# Patient Record
Sex: FEMALE | Race: WHITE | NOT HISPANIC OR LATINO | URBAN - METROPOLITAN AREA
[De-identification: names, ages, dates, MRNs, and addresses within clinical notes are randomized per-mention and may not be internally consistent; named-entity substitution may affect disease eponyms.]

---

## 2017-02-08 ENCOUNTER — APPOINTMENT (RX ONLY)
Dept: URBAN - METROPOLITAN AREA CLINIC 41 | Facility: CLINIC | Age: 52
Setting detail: DERMATOLOGY
End: 2017-02-08

## 2017-02-08 DIAGNOSIS — D18.0 HEMANGIOMA: ICD-10-CM

## 2017-02-08 PROBLEM — D18.01 HEMANGIOMA OF SKIN AND SUBCUTANEOUS TISSUE: Status: ACTIVE | Noted: 2017-02-08

## 2017-02-08 PROCEDURE — 99201: CPT

## 2017-02-08 PROCEDURE — ? COUNSELING

## 2017-02-08 PROCEDURE — ? DEFER

## 2017-02-08 ASSESSMENT — LOCATION SIMPLE DESCRIPTION DERM
LOCATION SIMPLE: RIGHT LIP
LOCATION SIMPLE: LEFT LOWER LIP
LOCATION SIMPLE: RIGHT CHEEK

## 2017-02-08 ASSESSMENT — LOCATION ZONE DERM
LOCATION ZONE: FACE
LOCATION ZONE: LIP

## 2017-02-08 ASSESSMENT — LOCATION DETAILED DESCRIPTION DERM
LOCATION DETAILED: RIGHT UPPER CUTANEOUS LIP
LOCATION DETAILED: RIGHT INFERIOR VERMILION LIP
LOCATION DETAILED: RIGHT INFERIOR CENTRAL MALAR CHEEK
LOCATION DETAILED: LEFT INFERIOR VERMILION BORDER
LOCATION DETAILED: RIGHT INFERIOR LATERAL MALAR CHEEK

## 2017-02-08 NOTE — PROCEDURE: DEFER
Procedure To Be Performed At Next Visit: Laser: Q-switched Nd:Yag 1064nm
Detail Level: Simple
Scheduling Instructions (Optional): Patient has approx 5 angiomas and quoted at $75 per treatment, patient given topical numbing

## 2017-02-23 ENCOUNTER — APPOINTMENT (RX ONLY)
Dept: URBAN - METROPOLITAN AREA CLINIC 41 | Facility: CLINIC | Age: 52
Setting detail: DERMATOLOGY
End: 2017-02-23

## 2017-02-23 DIAGNOSIS — D18.0 HEMANGIOMA: ICD-10-CM

## 2017-02-23 PROBLEM — E78.5 HYPERLIPIDEMIA, UNSPECIFIED: Status: ACTIVE | Noted: 2017-02-23

## 2017-02-23 PROBLEM — D18.01 HEMANGIOMA OF SKIN AND SUBCUTANEOUS TISSUE: Status: ACTIVE | Noted: 2017-02-23

## 2017-02-23 PROBLEM — F32.9 MAJOR DEPRESSIVE DISORDER, SINGLE EPISODE, UNSPECIFIED: Status: ACTIVE | Noted: 2017-02-23

## 2017-02-23 PROBLEM — M12.9 ARTHROPATHY, UNSPECIFIED: Status: ACTIVE | Noted: 2017-02-23

## 2017-02-23 PROCEDURE — ? EXCEL V LASER

## 2017-02-23 ASSESSMENT — LOCATION DETAILED DESCRIPTION DERM
LOCATION DETAILED: LEFT INFERIOR VERMILION BORDER
LOCATION DETAILED: RIGHT INFERIOR CENTRAL MALAR CHEEK
LOCATION DETAILED: RIGHT LATERAL BUCCAL CHEEK
LOCATION DETAILED: RIGHT UPPER CUTANEOUS LIP

## 2017-02-23 ASSESSMENT — LOCATION ZONE DERM
LOCATION ZONE: FACE
LOCATION ZONE: LIP

## 2017-02-23 ASSESSMENT — LOCATION SIMPLE DESCRIPTION DERM
LOCATION SIMPLE: RIGHT LIP
LOCATION SIMPLE: RIGHT CHEEK
LOCATION SIMPLE: LEFT LOWER LIP

## 2017-02-23 NOTE — PROCEDURE: EXCEL V LASER
Fluence In J: 70
Pre-Procedure Text: After consent was obtained and the procedure was explained, all persons present in the exam room put on their protective eyewear. Cold gel was applied to the treatment areas.
Total Pulses: 1
Procedural Text: The treatment areas where then treated as noted above.
Post-Procedure Text: Following the treatment, ice and sunblock was applied to the treatment areas.  Post-care instructions were discussed.
Topical Anesthesia Type: 30% lidocaine
Consent: Written consent obtained, risks reviewed including but not limited to crusting, scabbing, blistering, scarring, darker or lighter pigmentary change, and/or incomplete removal.
Detail Level: Detailed
Length Of Topical Anesthesia Application (Optional): 40 minutes
Laser Type: KTP 1064nm
Post-Care Instructions: I reviewed with the patient in detail post-care instructions. Patient is to apply vaseline with a q-tip to all crusted areas, and avoid picking at any scabs. Pt should stay away from the sun and wear sun protection until fully healed.
Pulse Width In Msec: 45
Spot Size: 5
Price (Use Numbers Only, No Special Characters Or $): 75
Fluence In J: 80
Pulse Width In Msec: 30
Pulse Width In Msec: 10
Fluence In J: 7.0
Laser Type: KTP 532nm

## 2017-06-01 ENCOUNTER — APPOINTMENT (RX ONLY)
Dept: URBAN - METROPOLITAN AREA CLINIC 41 | Facility: CLINIC | Age: 52
Setting detail: DERMATOLOGY
End: 2017-06-01

## 2017-06-01 DIAGNOSIS — L98.8 OTHER SPECIFIED DISORDERS OF THE SKIN AND SUBCUTANEOUS TISSUE: ICD-10-CM

## 2017-06-01 DIAGNOSIS — I78.8 OTHER DISEASES OF CAPILLARIES: ICD-10-CM

## 2017-06-01 DIAGNOSIS — D18.0 HEMANGIOMA: ICD-10-CM | Status: IMPROVED

## 2017-06-01 DIAGNOSIS — Z41.9 ENCOUNTER FOR PROCEDURE FOR PURPOSES OTHER THAN REMEDYING HEALTH STATE, UNSPECIFIED: ICD-10-CM

## 2017-06-01 PROBLEM — D18.01 HEMANGIOMA OF SKIN AND SUBCUTANEOUS TISSUE: Status: ACTIVE | Noted: 2017-06-01

## 2017-06-01 PROCEDURE — ? EXCEL V LASER

## 2017-06-01 PROCEDURE — ? MEDICAL CONSULTATION: FILLERS

## 2017-06-01 ASSESSMENT — LOCATION ZONE DERM
LOCATION ZONE: LIP
LOCATION ZONE: FACE

## 2017-06-01 ASSESSMENT — LOCATION DETAILED DESCRIPTION DERM
LOCATION DETAILED: RIGHT INFERIOR CENTRAL MALAR CHEEK
LOCATION DETAILED: RIGHT LATERAL BUCCAL CHEEK
LOCATION DETAILED: RIGHT INFERIOR VERMILION LIP
LOCATION DETAILED: RIGHT UPPER CUTANEOUS LIP

## 2017-06-01 ASSESSMENT — LOCATION SIMPLE DESCRIPTION DERM
LOCATION SIMPLE: RIGHT LIP
LOCATION SIMPLE: RIGHT CHEEK

## 2017-06-01 NOTE — PROCEDURE: EXCEL V LASER
Consent: Written consent obtained, risks reviewed including but not limited to crusting, scabbing, blistering, scarring, darker or lighter pigmentary change, and/or incomplete removal.
Pre-Procedure Text: After consent was obtained and the procedure was explained, all persons present in the exam room put on their protective eyewear. Cold gel was applied to the treatment areas.
Detail Level: Simple
Post-Care Instructions: I reviewed with the patient in detail post-care instructions. Patient is to apply vaseline with a q-tip to all crusted areas, and avoid picking at any scabs. Pt should stay away from the sun and wear sun protection until fully healed.
Spot Size: 5
Post-Procedure Text: Following the treatment, ice and sunblock was applied to the treatment areas.  Post-care instructions were discussed.
Laser Type: KTP 1064nm
Procedural Text: The treatment areas where then treated as noted above. These were veins on the nasal ala.
Pulse Width In Msec: 35
Treatment Number (Will Not Render If 0): 0
Fluence In J: 85.0
Total Pulses: 7
Total Pulses: 2
Pulse Width In Msec: 10
Laser Type: KTP 532nm
Fluence In J: 8.0

## 2017-09-21 ENCOUNTER — APPOINTMENT (RX ONLY)
Dept: URBAN - METROPOLITAN AREA CLINIC 41 | Facility: CLINIC | Age: 52
Setting detail: DERMATOLOGY
End: 2017-09-21

## 2017-09-21 DIAGNOSIS — L98.8 OTHER SPECIFIED DISORDERS OF THE SKIN AND SUBCUTANEOUS TISSUE: ICD-10-CM | Status: IMPROVED

## 2017-09-21 DIAGNOSIS — D18.0 HEMANGIOMA: ICD-10-CM

## 2017-09-21 DIAGNOSIS — I78.8 OTHER DISEASES OF CAPILLARIES: ICD-10-CM | Status: IMPROVED

## 2017-09-21 PROBLEM — D18.01 HEMANGIOMA OF SKIN AND SUBCUTANEOUS TISSUE: Status: ACTIVE | Noted: 2017-09-21

## 2017-09-21 PROCEDURE — ? EXCEL V LASER

## 2017-09-21 ASSESSMENT — LOCATION DETAILED DESCRIPTION DERM
LOCATION DETAILED: RIGHT INFERIOR VERMILION LIP
LOCATION DETAILED: RIGHT UPPER CUTANEOUS LIP
LOCATION DETAILED: RIGHT INFERIOR CENTRAL MALAR CHEEK

## 2017-09-21 ASSESSMENT — LOCATION ZONE DERM
LOCATION ZONE: FACE
LOCATION ZONE: LIP

## 2017-09-21 ASSESSMENT — LOCATION SIMPLE DESCRIPTION DERM
LOCATION SIMPLE: RIGHT CHEEK
LOCATION SIMPLE: RIGHT LIP

## 2017-09-21 NOTE — PROCEDURE: EXCEL V LASER
Post-Procedure Text: Following the treatment, ice and sunblock was applied to the treatment areas.  Post-care instructions were discussed.
Laser Type: KTP 1064nm
Pre-Procedure Text: After consent was obtained and the procedure was explained, all persons present in the exam room put on their protective eyewear. Cold gel was applied to the treatment areas.
Treatment Number (Will Not Render If 0): 2
Topical Anesthesia Type: 30% lidocaine
Detail Level: Simple
Consent: Written consent obtained, risks reviewed including but not limited to crusting, scabbing, blistering, scarring, darker or lighter pigmentary change, and/or incomplete removal.
Length Of Topical Anesthesia Application (Optional): 30 minutes
Price (Use Numbers Only, No Special Characters Or $): 75
Procedural Text: The treatment areas where then treated as noted above. These were veins on the nasal ala.
Spot Size: 5
Fluence In J: 85
Post-Care Instructions: I reviewed with the patient in detail post-care instructions. Patient is to apply vaseline with a q-tip to all crusted areas, and avoid picking at any scabs. Pt should stay away from the sun and wear sun protection until fully healed.
Fluence In J: 8.0
Laser Type: KTP 532nm
Pulse Width In Msec: 10
Pulse Width In Msec: 12

## 2020-11-30 ENCOUNTER — APPOINTMENT (RX ONLY)
Dept: URBAN - METROPOLITAN AREA CLINIC 41 | Facility: CLINIC | Age: 55
Setting detail: DERMATOLOGY
End: 2020-11-30

## 2020-11-30 VITALS — TEMPERATURE: 96.4 F

## 2020-11-30 DIAGNOSIS — Z41.9 ENCOUNTER FOR PROCEDURE FOR PURPOSES OTHER THAN REMEDYING HEALTH STATE, UNSPECIFIED: ICD-10-CM

## 2020-11-30 PROCEDURE — ? COSMETIC CONSULTATION - RED SPOTS

## 2020-11-30 PROCEDURE — ? MEDICAL CONSULTATION: FILLERS

## 2020-11-30 PROCEDURE — ? MEDICAL CONSULTATION: DYNAMIC RHYTIDES

## 2020-12-21 ENCOUNTER — APPOINTMENT (RX ONLY)
Dept: URBAN - METROPOLITAN AREA CLINIC 41 | Facility: CLINIC | Age: 55
Setting detail: DERMATOLOGY
End: 2020-12-21

## 2020-12-21 VITALS — TEMPERATURE: 97.7 F

## 2020-12-21 DIAGNOSIS — Z41.9 ENCOUNTER FOR PROCEDURE FOR PURPOSES OTHER THAN REMEDYING HEALTH STATE, UNSPECIFIED: ICD-10-CM

## 2020-12-21 PROCEDURE — ? DYSPORT

## 2020-12-21 PROCEDURE — ? EXCEL V LASER

## 2020-12-21 ASSESSMENT — LOCATION SIMPLE DESCRIPTION DERM: LOCATION SIMPLE: RIGHT CHEEK

## 2020-12-21 ASSESSMENT — LOCATION ZONE DERM: LOCATION ZONE: FACE

## 2020-12-21 ASSESSMENT — LOCATION DETAILED DESCRIPTION DERM: LOCATION DETAILED: RIGHT INFERIOR CENTRAL MALAR CHEEK

## 2020-12-21 NOTE — PROCEDURE: DYSPORT
Right Pupillary Line Units: 0
Lot #: [id number]
Show Lcl Units: No
Show Glabellar Units: Yes
Detail Level: Simple
Additional Area 1 Units: 60
Expiration Date (Month Year): 12/31/2020
Dilution (U/0.1 Cc): 10
Consent: Written consent obtained. Risks include but not limited to lid/brow ptosis, bruising, swelling, diplopia, temporary effect, incomplete chemical denervation.
Post-Care Instructions: Patient instructed to not lie down for 4 hours and limit physical activity for 24 hours. Patient instructed not to travel by airplane for 48 hours.
Additional Area 1 Location: see drawing

## 2020-12-21 NOTE — PROCEDURE: EXCEL V LASER
Consent: Written consent obtained, risks reviewed including but not limited to crusting, scabbing, blistering, scarring, darker or lighter pigmentary change, and/or incomplete removal.
Laser Type: KTP 532nm
External Cooling Fan Speed: 5
Post-Care Instructions: I reviewed with the patient in detail post-care instructions. Patient is to apply vaseline, aquaphor, or any unscented moisturizer to all affected areas, and avoid picking at any scabs. Pt should stay away from the sun and wear sun protection until fully healed.
Total Pulses: 17
Price (Use Numbers Only, No Special Characters Or $): 150
Treatment Number (Will Not Render If 0): 2
Topical Anesthesia Type: 30% lidocaine
Temperature: 5 C
Fluence In J: 8.6
Pulse Width In Msec: 10
Detail Level: Zone

## 2021-01-13 ENCOUNTER — APPOINTMENT (RX ONLY)
Dept: URBAN - METROPOLITAN AREA CLINIC 2 | Facility: CLINIC | Age: 56
Setting detail: DERMATOLOGY
End: 2021-01-13

## 2021-01-13 DIAGNOSIS — Z41.9 ENCOUNTER FOR PROCEDURE FOR PURPOSES OTHER THAN REMEDYING HEALTH STATE, UNSPECIFIED: ICD-10-CM

## 2021-01-13 PROCEDURE — ? FILLERS

## 2021-01-13 NOTE — PROCEDURE: FILLERS
Cheeks Filler Volume In Cc: 1
Additional Area 5 Volume In Cc: 0
Filler: Restylane Defyne
Map Statment: See Attach Map for Complete Details
Lot #: [id number]
Include Cannula Information In Note?: No
Anesthesia Type: 0.2% lidocaine (mixed within filler)
Lot #: [id number]
Expiration Date (Month Year): 2022-11-30
Lot #: [id number]
Expiration Date (Month Year): 2019/08/05
Expiration Date (Month Year): 2022-01-31
Additional Area 1 Location: see drawing
Topical Anesthesia?: 30% lidocaine
Detail Level: Simple
Consent: Written consent obtained. Risks include but not limited to bruising, beading, irregular texture, ulceration, infection, allergic reaction, scar formation, incomplete augmentation, temporary nature, procedural pain.
Filler: Faustino Friend
Post-Care Instructions: Patient instructed to apply ice to reduce swelling.
Use Map Statement For Sites (Optional): Yes

## 2023-10-30 ENCOUNTER — ANESTHESIA EVENT (OUTPATIENT)
Dept: OPERATING ROOM | Age: 58
End: 2023-10-30
Payer: COMMERCIAL

## 2023-10-30 RX ORDER — LEVOTHYROXINE SODIUM 0.03 MG/1
75 TABLET ORAL DAILY
COMMUNITY

## 2023-11-03 ASSESSMENT — LIFESTYLE VARIABLES: SMOKING_STATUS: 0

## 2023-11-06 ENCOUNTER — ANESTHESIA (OUTPATIENT)
Dept: OPERATING ROOM | Age: 58
End: 2023-11-06
Payer: COMMERCIAL

## 2023-11-06 ENCOUNTER — HOSPITAL ENCOUNTER (OUTPATIENT)
Age: 58
Setting detail: OUTPATIENT SURGERY
Discharge: HOME | End: 2023-11-06
Attending: PLASTIC SURGERY | Admitting: PLASTIC SURGERY
Payer: COMMERCIAL

## 2023-11-06 VITALS
RESPIRATION RATE: 16 BRPM | WEIGHT: 157.6 LBS | BODY MASS INDEX: 24.74 KG/M2 | SYSTOLIC BLOOD PRESSURE: 137 MMHG | HEART RATE: 61 BPM | TEMPERATURE: 97 F | HEIGHT: 67 IN | DIASTOLIC BLOOD PRESSURE: 77 MMHG | OXYGEN SATURATION: 100 %

## 2023-11-06 DIAGNOSIS — C44.319 BASAL CELL CARCINOMA OF SKIN OF OTHER PARTS OF FACE: ICD-10-CM

## 2023-11-06 PROCEDURE — 6360000002 HC RX W HCPCS

## 2023-11-06 PROCEDURE — 3600000014 HC SURGERY LEVEL 4 ADDTL 15MIN: Performed by: PLASTIC SURGERY

## 2023-11-06 PROCEDURE — 7100000010 HC PHASE II RECOVERY - FIRST 15 MIN: Performed by: PLASTIC SURGERY

## 2023-11-06 PROCEDURE — 3700000001 HC ADD 15 MINUTES (ANESTHESIA): Performed by: PLASTIC SURGERY

## 2023-11-06 PROCEDURE — 3700000000 HC ANESTHESIA ATTENDED CARE: Performed by: PLASTIC SURGERY

## 2023-11-06 PROCEDURE — 2580000003 HC RX 258

## 2023-11-06 PROCEDURE — 2580000003 HC RX 258: Performed by: STUDENT IN AN ORGANIZED HEALTH CARE EDUCATION/TRAINING PROGRAM

## 2023-11-06 PROCEDURE — 6370000000 HC RX 637 (ALT 250 FOR IP): Performed by: PLASTIC SURGERY

## 2023-11-06 PROCEDURE — 2500000003 HC RX 250 WO HCPCS: Performed by: PLASTIC SURGERY

## 2023-11-06 PROCEDURE — 7100000011 HC PHASE II RECOVERY - ADDTL 15 MIN: Performed by: PLASTIC SURGERY

## 2023-11-06 PROCEDURE — 3600000004 HC SURGERY LEVEL 4 BASE: Performed by: PLASTIC SURGERY

## 2023-11-06 PROCEDURE — 88305 TISSUE EXAM BY PATHOLOGIST: CPT

## 2023-11-06 PROCEDURE — 2709999900 HC NON-CHARGEABLE SUPPLY: Performed by: PLASTIC SURGERY

## 2023-11-06 RX ORDER — GINSENG 100 MG
CAPSULE ORAL PRN
Status: DISCONTINUED | OUTPATIENT
Start: 2023-11-06 | End: 2023-11-06 | Stop reason: ALTCHOICE

## 2023-11-06 RX ORDER — SODIUM CHLORIDE, SODIUM LACTATE, POTASSIUM CHLORIDE, CALCIUM CHLORIDE 600; 310; 30; 20 MG/100ML; MG/100ML; MG/100ML; MG/100ML
INJECTION, SOLUTION INTRAVENOUS CONTINUOUS PRN
Status: DISCONTINUED | OUTPATIENT
Start: 2023-11-06 | End: 2023-11-06 | Stop reason: SDUPTHER

## 2023-11-06 RX ORDER — LIDOCAINE HYDROCHLORIDE AND EPINEPHRINE 10; 10 MG/ML; UG/ML
INJECTION, SOLUTION INFILTRATION; PERINEURAL PRN
Status: DISCONTINUED | OUTPATIENT
Start: 2023-11-06 | End: 2023-11-06 | Stop reason: ALTCHOICE

## 2023-11-06 RX ORDER — MIDAZOLAM HYDROCHLORIDE 1 MG/ML
INJECTION INTRAMUSCULAR; INTRAVENOUS PRN
Status: DISCONTINUED | OUTPATIENT
Start: 2023-11-06 | End: 2023-11-06 | Stop reason: SDUPTHER

## 2023-11-06 RX ORDER — SODIUM CHLORIDE 9 MG/ML
INJECTION, SOLUTION INTRAVENOUS PRN
Status: DISCONTINUED | OUTPATIENT
Start: 2023-11-06 | End: 2023-11-06 | Stop reason: HOSPADM

## 2023-11-06 RX ORDER — SODIUM CHLORIDE, SODIUM LACTATE, POTASSIUM CHLORIDE, CALCIUM CHLORIDE 600; 310; 30; 20 MG/100ML; MG/100ML; MG/100ML; MG/100ML
INJECTION, SOLUTION INTRAVENOUS CONTINUOUS
Status: DISCONTINUED | OUTPATIENT
Start: 2023-11-06 | End: 2023-11-06 | Stop reason: HOSPADM

## 2023-11-06 RX ORDER — PROPOFOL 10 MG/ML
INJECTION, EMULSION INTRAVENOUS PRN
Status: DISCONTINUED | OUTPATIENT
Start: 2023-11-06 | End: 2023-11-06 | Stop reason: SDUPTHER

## 2023-11-06 RX ORDER — FENTANYL CITRATE 50 UG/ML
INJECTION, SOLUTION INTRAMUSCULAR; INTRAVENOUS PRN
Status: DISCONTINUED | OUTPATIENT
Start: 2023-11-06 | End: 2023-11-06 | Stop reason: SDUPTHER

## 2023-11-06 RX ORDER — SODIUM CHLORIDE 9 MG/ML
INJECTION, SOLUTION INTRAVENOUS CONTINUOUS
Status: DISCONTINUED | OUTPATIENT
Start: 2023-11-06 | End: 2023-11-06 | Stop reason: HOSPADM

## 2023-11-06 RX ORDER — SODIUM CHLORIDE 0.9 % (FLUSH) 0.9 %
5-40 SYRINGE (ML) INJECTION PRN
Status: DISCONTINUED | OUTPATIENT
Start: 2023-11-06 | End: 2023-11-06 | Stop reason: HOSPADM

## 2023-11-06 RX ORDER — SODIUM CHLORIDE 0.9 % (FLUSH) 0.9 %
5-40 SYRINGE (ML) INJECTION EVERY 12 HOURS SCHEDULED
Status: DISCONTINUED | OUTPATIENT
Start: 2023-11-06 | End: 2023-11-06 | Stop reason: HOSPADM

## 2023-11-06 RX ADMIN — FENTANYL CITRATE 25 MCG: 50 INJECTION INTRAMUSCULAR; INTRAVENOUS at 08:27

## 2023-11-06 RX ADMIN — SODIUM CHLORIDE, POTASSIUM CHLORIDE, SODIUM LACTATE AND CALCIUM CHLORIDE: 600; 310; 30; 20 INJECTION, SOLUTION INTRAVENOUS at 07:52

## 2023-11-06 RX ADMIN — SODIUM CHLORIDE, POTASSIUM CHLORIDE, SODIUM LACTATE AND CALCIUM CHLORIDE: 600; 310; 30; 20 INJECTION, SOLUTION INTRAVENOUS at 07:31

## 2023-11-06 RX ADMIN — PROPOFOL 30 MG: 10 INJECTION, EMULSION INTRAVENOUS at 07:57

## 2023-11-06 RX ADMIN — FENTANYL CITRATE 25 MCG: 50 INJECTION INTRAMUSCULAR; INTRAVENOUS at 08:09

## 2023-11-06 RX ADMIN — MIDAZOLAM 2 MG: 1 INJECTION INTRAMUSCULAR; INTRAVENOUS at 07:54

## 2023-11-06 RX ADMIN — FENTANYL CITRATE 50 MCG: 50 INJECTION INTRAMUSCULAR; INTRAVENOUS at 07:57

## 2023-11-06 RX ADMIN — PROPOFOL 50 MCG/KG/MIN: 10 INJECTION, EMULSION INTRAVENOUS at 07:58

## 2023-11-06 ASSESSMENT — PAIN SCALES - GENERAL
PAINLEVEL_OUTOF10: 0

## 2023-11-06 ASSESSMENT — PAIN - FUNCTIONAL ASSESSMENT: PAIN_FUNCTIONAL_ASSESSMENT: NONE - DENIES PAIN

## 2023-11-06 NOTE — DISCHARGE INSTRUCTIONS
No strenuous activity. Tylenol and ibuprofen for pain. Keep dressings intact. Follow-up with me tomorrow. Infection After Surgery: Care Instructions  Overview  After surgery, an infection is always possible. It doesn't mean that the surgery didn't go well. Because an infection can be serious, your doctor has taken steps to manage it. Your doctor checked the infection and cleaned it if necessary. Your doctor may have made an opening in the area so that the pus can drain out. You may have gauze in the cut so that the area will stay open and keep draining. You may need antibiotics. You will need to follow up with your doctor to make sure the infection has gone away. Follow-up care is a key part of your treatment and safety. Be sure to make and go to all appointments, and call your doctor if you are having problems. It's also a good idea to know your test results and keep a list of the medicines you take. How can you care for yourself at home? Make sure your surgeon knows about the infection, especially if you saw another doctor about your symptoms. If your doctor prescribed antibiotics, take them as directed. Do not stop taking them just because you feel better. You need to take the full course of antibiotics. Ask your doctor if you can take an over-the-counter pain medicine, such as acetaminophen (Tylenol), ibuprofen (Advil, Motrin), or naproxen (Aleve). Be safe with medicines. Read and follow all instructions on the label. Do not take two or more pain medicines at the same time unless the doctor told you to. Many pain medicines have acetaminophen, which is Tylenol. Too much acetaminophen (Tylenol) can be harmful. Prop up the area on a pillow anytime you sit or lie down during the next 3 days. Try to keep it above the level of your heart. This will help reduce swelling. Keep the skin clean and dry. You may have a bandage over the cut (incision).  A bandage helps the incision heal and

## 2023-11-06 NOTE — ANESTHESIA POSTPROCEDURE EVALUATION
Department of Anesthesiology  Postprocedure Note    Patient: Puneet Mckeon  MRN: [id number]  YOB: 1965  Date of evaluation: 11/6/2023      Procedure Summary     Date: 11/06/23 Room / Location: [address] [place] [address] 01 / [id number] Hugo Cavazos    Anesthesia Start: 8611 Anesthesia Stop: 0848    Procedure: EXCISION BASAL CELL CARCINOMA RIGHT LATERAL NASAL SIDEWALL WITH FROZEN SECTION AND RECONSTRUCTION (Right: Nose) Diagnosis:       Basal cell carcinoma of skin of other parts of face      (Basal cell carcinoma of skin of other parts of face [C44.319])    Surgeons: Danni Reynoso MD Responsible Provider: Denzel Rojo MD    Anesthesia Type: MAC ASA Status: 2          Anesthesia Type: No value filed.     Merry Phase I: Merry Score: 10    Merry Phase II:        Anesthesia Post Evaluation    Patient location during evaluation: bedside  Patient participation: complete - patient participated  Level of consciousness: awake  Airway patency: patent  Nausea & Vomiting: no nausea and no vomiting  Complications: no  Cardiovascular status: hemodynamically stable and blood pressure returned to baseline  Respiratory status: acceptable  Hydration status: euvolemic  Pain management: satisfactory to patient

## 2023-11-06 NOTE — PROGRESS NOTES
INTRAOPERATIVE CONSULTATION (with FROZEN SECTION)     Skin, Right lateral nasal sidewall, excision: The sampled 12:00, 6:00, 3:00, 9:00 and deep margins are NEGATIVE for carcinoma.     Gerardo Cota MD

## 2023-11-06 NOTE — OP NOTE
Operative Note      Patient: Ted Tellez  YOB: 1965  MRN: [id number]    Date of Procedure: 11/6/2023    Preoperative diagnosis: Basal cell carcinoma right lateral nasal sidewall    Post-Op Diagnosis: Same       Procedure #1: Basal cell carcinoma right lateral nasal sidewall with frozen section control measuring 1.1 x 0.8 cm  Procedure #2: Complex closure right lateral nasal sidewall measuring 1.8 cm    Surgeon(s):  Edil Irizarry MD    Assistant:   * No surgical staff found *    Anesthesia: Monitor Anesthesia Care    Estimated Blood Loss (mL): Minimal    Complications: None    Specimens:   ID Type Source Tests Collected by Time Destination   A : BASAL CELL CARCINOMA RIGHT LATERAL NASAL SIDEWALL WITH FROZEN SECTION STITCH AT 12 O'CLOCK Tissue Tissue SURGICAL PATHOLOGY Yuliana Brooks MD 11/6/2023 0809        Implants:  * No implants in log *      Drains: * No LDAs found *    Findings: Basal cell carcinoma with clear margins seen on frozen section control    This procedure was not performed to treat primary cutaneous melanoma through wide local excision      Detailed Description of Procedure: This is a 60-year-old female with a biopsy-proven basal cell carcinoma at the junction of the right lateral nasal sidewall with the right lower eyelid. This lesion is just inferior and medial to the right medial canthus. The patient was seen in the preoperative holding area and marked. This was confirmed with the patient. She was then brought back to the operating room and placed in the supine position where MAC anesthesia was initiated. The face was then prepped and draped in sterile fashion. Under loupe magnification I marked out the pattern for excision such that there was a border of normal-appearing skin measuring 2 to 3 mm in all directions. This measured approximately 1.1 by 0.8 cm.  1% lidocaine with epinephrine was then injected locally. Incisions were made along our markings.
Full range of motion of upper and lower extremities, no joint tenderness/swelling.

## 2023-11-09 LAB — SURGICAL PATHOLOGY REPORT: NORMAL

## 2024-10-21 ENCOUNTER — APPOINTMENT (OUTPATIENT)
Dept: CARDIOLOGY | Facility: HOSPITAL | Age: 59
End: 2024-10-21
Payer: COMMERCIAL

## 2024-10-21 ENCOUNTER — APPOINTMENT (OUTPATIENT)
Dept: RADIOLOGY | Facility: HOSPITAL | Age: 59
End: 2024-10-21
Payer: COMMERCIAL

## 2024-10-21 ENCOUNTER — HOSPITAL ENCOUNTER (EMERGENCY)
Facility: HOSPITAL | Age: 59
Discharge: HOME | End: 2024-10-21
Attending: EMERGENCY MEDICINE
Payer: COMMERCIAL

## 2024-10-21 VITALS
DIASTOLIC BLOOD PRESSURE: 84 MMHG | TEMPERATURE: 97.7 F | HEIGHT: 67 IN | RESPIRATION RATE: 18 BRPM | SYSTOLIC BLOOD PRESSURE: 149 MMHG | WEIGHT: 162 LBS | OXYGEN SATURATION: 95 % | BODY MASS INDEX: 25.43 KG/M2 | HEART RATE: 58 BPM

## 2024-10-21 DIAGNOSIS — R07.9 CHEST PAIN AT REST: Primary | ICD-10-CM

## 2024-10-21 LAB
ALBUMIN SERPL BCP-MCNC: 4.1 G/DL (ref 3.4–5)
ALP SERPL-CCNC: 95 U/L (ref 33–110)
ALT SERPL W P-5'-P-CCNC: 19 U/L (ref 7–45)
ANION GAP SERPL CALC-SCNC: 10 MMOL/L (ref 10–20)
AST SERPL W P-5'-P-CCNC: 15 U/L (ref 9–39)
BASOPHILS # BLD AUTO: 0.07 X10*3/UL (ref 0–0.1)
BASOPHILS NFR BLD AUTO: 1.3 %
BILIRUB SERPL-MCNC: 0.3 MG/DL (ref 0–1.2)
BUN SERPL-MCNC: 18 MG/DL (ref 6–23)
CALCIUM SERPL-MCNC: 9.1 MG/DL (ref 8.6–10.3)
CARDIAC TROPONIN I PNL SERPL HS: 3 NG/L (ref 0–13)
CARDIAC TROPONIN I PNL SERPL HS: 3 NG/L (ref 0–13)
CHLORIDE SERPL-SCNC: 104 MMOL/L (ref 98–107)
CO2 SERPL-SCNC: 30 MMOL/L (ref 21–32)
CREAT SERPL-MCNC: 0.88 MG/DL (ref 0.5–1.05)
EGFRCR SERPLBLD CKD-EPI 2021: 76 ML/MIN/1.73M*2
EOSINOPHIL # BLD AUTO: 0.09 X10*3/UL (ref 0–0.7)
EOSINOPHIL NFR BLD AUTO: 1.7 %
ERYTHROCYTE [DISTWIDTH] IN BLOOD BY AUTOMATED COUNT: 12.3 % (ref 11.5–14.5)
GLUCOSE SERPL-MCNC: 119 MG/DL (ref 74–99)
HCT VFR BLD AUTO: 40.4 % (ref 36–46)
HGB BLD-MCNC: 13.3 G/DL (ref 12–16)
HOLD SPECIMEN: NORMAL
IMM GRANULOCYTES # BLD AUTO: 0.1 X10*3/UL (ref 0–0.7)
IMM GRANULOCYTES NFR BLD AUTO: 1.9 % (ref 0–0.9)
LYMPHOCYTES # BLD AUTO: 1.16 X10*3/UL (ref 1.2–4.8)
LYMPHOCYTES NFR BLD AUTO: 22.1 %
MCH RBC QN AUTO: 30.2 PG (ref 26–34)
MCHC RBC AUTO-ENTMCNC: 32.9 G/DL (ref 32–36)
MCV RBC AUTO: 92 FL (ref 80–100)
MONOCYTES # BLD AUTO: 0.56 X10*3/UL (ref 0.1–1)
MONOCYTES NFR BLD AUTO: 10.6 %
NEUTROPHILS # BLD AUTO: 3.28 X10*3/UL (ref 1.2–7.7)
NEUTROPHILS NFR BLD AUTO: 62.4 %
NRBC BLD-RTO: 0 /100 WBCS (ref 0–0)
PLATELET # BLD AUTO: 198 X10*3/UL (ref 150–450)
POTASSIUM SERPL-SCNC: 3.5 MMOL/L (ref 3.5–5.3)
PROT SERPL-MCNC: 7.2 G/DL (ref 6.4–8.2)
RBC # BLD AUTO: 4.4 X10*6/UL (ref 4–5.2)
SODIUM SERPL-SCNC: 140 MMOL/L (ref 136–145)
WBC # BLD AUTO: 5.3 X10*3/UL (ref 4.4–11.3)

## 2024-10-21 PROCEDURE — 84075 ASSAY ALKALINE PHOSPHATASE: CPT | Performed by: EMERGENCY MEDICINE

## 2024-10-21 PROCEDURE — 71045 X-RAY EXAM CHEST 1 VIEW: CPT | Mod: FOREIGN READ | Performed by: RADIOLOGY

## 2024-10-21 PROCEDURE — 71275 CT ANGIOGRAPHY CHEST: CPT | Mod: FOREIGN READ | Performed by: RADIOLOGY

## 2024-10-21 PROCEDURE — 36415 COLL VENOUS BLD VENIPUNCTURE: CPT | Performed by: EMERGENCY MEDICINE

## 2024-10-21 PROCEDURE — 93005 ELECTROCARDIOGRAM TRACING: CPT

## 2024-10-21 PROCEDURE — 84484 ASSAY OF TROPONIN QUANT: CPT | Performed by: EMERGENCY MEDICINE

## 2024-10-21 PROCEDURE — 71045 X-RAY EXAM CHEST 1 VIEW: CPT

## 2024-10-21 PROCEDURE — 99285 EMERGENCY DEPT VISIT HI MDM: CPT | Mod: 25

## 2024-10-21 PROCEDURE — 71275 CT ANGIOGRAPHY CHEST: CPT

## 2024-10-21 PROCEDURE — 85025 COMPLETE CBC W/AUTO DIFF WBC: CPT | Performed by: EMERGENCY MEDICINE

## 2024-10-21 PROCEDURE — 2550000001 HC RX 255 CONTRASTS: Performed by: EMERGENCY MEDICINE

## 2024-10-21 ASSESSMENT — COLUMBIA-SUICIDE SEVERITY RATING SCALE - C-SSRS
2. HAVE YOU ACTUALLY HAD ANY THOUGHTS OF KILLING YOURSELF?: NO
6. HAVE YOU EVER DONE ANYTHING, STARTED TO DO ANYTHING, OR PREPARED TO DO ANYTHING TO END YOUR LIFE?: NO
1. IN THE PAST MONTH, HAVE YOU WISHED YOU WERE DEAD OR WISHED YOU COULD GO TO SLEEP AND NOT WAKE UP?: NO

## 2024-10-21 ASSESSMENT — HEART SCORE
ECG: NON-SPECIFIC REPOLARIZATION DISTURBANCE
AGE: 45-64
TROPONIN: LESS THAN OR EQUAL TO NORMAL LIMIT
HISTORY: MODERATELY SUSPICIOUS
RISK FACTORS: 1-2 RISK FACTORS
HEART SCORE: 4

## 2024-10-21 ASSESSMENT — PAIN SCALES - GENERAL: PAINLEVEL_OUTOF10: 0 - NO PAIN

## 2024-10-21 ASSESSMENT — PAIN - FUNCTIONAL ASSESSMENT: PAIN_FUNCTIONAL_ASSESSMENT: 0-10

## 2024-10-21 NOTE — ED PROVIDER NOTES
Department of Emergency Medicine   ED  Provider Note  Admit Date/RoomTime: 10/21/2024  6:49 PM  ED Room: AC02/AC02                  History of Present Illness:   Candi Mann is a 59 y.o. female presenting to the ED for chest pain, beginning 1 week ago.  Pain has been episodic.  The complaint has been intermittent, moderate in severity, and worsened by nothing.  Patient says multiple episodes of midsternal chest discomfort over the last week.  Nothing seems to make these episodes, ago.  They last about 4 to 5 minutes or so.  She describes them as pressure/tightness.  She sometimes has some pain in the back of her neck with it.  Occasionally she has had some right arm pain.  She says she has only had nausea with one episode last week.  She has had no diaphoresis.  No new leg pain swelling or calf tenderness.  No recent long distance travel.  No URI symptoms.  She has history of hypothyroidism and hyperlipidemia.  No history of diabetes or hypertension.  She has a strong family history of heart disease.  She is a non-smoker at this time she says she quit 18 years ago.  Rare alcohol use.  No marijuana use.  Patient reports past history of echocardiogram and a stress test about 10 years ago.  Patient states she went to urgent care and was referred to the ER.  Patient currently denies chest pain.    Review of Systems:   Pertinent positives and review of systems as noted above.  Remaining 10 review of systems is negative or noncontributory to today's episode of care.  Review of Systems   A complete review of systems is otherwise negative except as noted above    --------------------------------------------- PAST HISTORY ---------------------------------------------  Past Medical History:  has a past medical history of Disease of thyroid gland and Mitral valve disorder.    Past Surgical History:  has a past surgical history that includes Hysterectomy.    Social History:  reports that she has quit smoking. Her smoking  use included cigarettes. She has never used smokeless tobacco. She reports that she does not currently use drugs.No tobacco use currently.  Rare alcohol use.  No marijuana or recreational drug use.    Family History: family history is not on file. Unless otherwise noted, family history is non contributory.  Patient states that everyone in her family has heart disease or is  from heart disease.    Patient's Medications    No medications on file      The patient’s home medications have been reviewed.    Allergies: Lavender oil and Penicillins    -------------------------------------------------- RESULTS -------------------------------------------------  All laboratory and radiology results have been personally reviewed by myself   LABS:  Labs Reviewed   CBC WITH AUTO DIFFERENTIAL - Abnormal       Result Value    WBC 5.3      nRBC 0.0      RBC 4.40      Hemoglobin 13.3      Hematocrit 40.4      MCV 92      MCH 30.2      MCHC 32.9      RDW 12.3      Platelets 198      Neutrophils % 62.4      Immature Granulocytes %, Automated 1.9 (*)     Lymphocytes % 22.1      Monocytes % 10.6      Eosinophils % 1.7      Basophils % 1.3      Neutrophils Absolute 3.28      Immature Granulocytes Absolute, Automated 0.10      Lymphocytes Absolute 1.16 (*)     Monocytes Absolute 0.56      Eosinophils Absolute 0.09      Basophils Absolute 0.07     COMPREHENSIVE METABOLIC PANEL - Abnormal    Glucose 119 (*)     Sodium 140      Potassium 3.5      Chloride 104      Bicarbonate 30      Anion Gap 10      Urea Nitrogen 18      Creatinine 0.88      eGFR 76      Calcium 9.1      Albumin 4.1      Alkaline Phosphatase 95      Total Protein 7.2      AST 15      Bilirubin, Total 0.3      ALT 19     SERIAL TROPONIN-INITIAL - Normal    Troponin I, High Sensitivity 3      Narrative:     Less than 99th percentile of normal range cutoff-  Female and children under 18 years old <14 ng/L; Male <21 ng/L: Negative  Repeat testing should be performed if  clinically indicated.     Female and children under 18 years old 14-50 ng/L; Male 21-50 ng/L:  Consistent with possible cardiac damage and possible increased clinical   risk. Serial measurements may help to assess extent of myocardial damage.     >50 ng/L: Consistent with cardiac damage, increased clinical risk and  myocardial infarction. Serial measurements may help assess extent of   myocardial damage.      NOTE: Children less than 1 year old may have higher baseline troponin   levels and results should be interpreted in conjunction with the overall   clinical context.     NOTE: Troponin I testing is performed using a different   testing methodology at [facility] than at other   [facility]. Direct result comparisons should only   be made within the same method.   TROPONIN SERIES- (INITIAL, 1 HR)    Narrative:     The following orders were created for panel order Troponin I Series, High Sensitivity (0, 1 HR).  Procedure                               Abnormality         Status                     ---------                               -----------         ------                     Troponin I, High Sensiti...[[id number]]  Normal              Final result               Troponin, High Sensitivi...[[id number]]                      In process                   Please view results for these tests on the individual orders.   SERIAL TROPONIN, 1 HOUR   GRAY TOP   B-TYPE NATRIURETIC PEPTIDE   D-DIMER, VTE EXCLUSION         RADIOLOGY:  Interpreted by Radiologist.  XR chest 1 view   Final Result   1.Top normal to mildly enlarged heart size with no signs of   active pulmonary parenchymal infiltration.   2.Lobulated contour of the right diaphragm most likely   developmental.  CT could be considered for confirmation or comparison   with prior studies if available from elsewhere.   Signed by Monica Davidson, DO      CT angio chest for pulmonary embolism    (Results Pending)       No results found for this or any  "previous visit (from the past 4464 hours).  ------------------------- NURSING NOTES AND VITALS REVIEWED ---------------------------   The nursing notes within the ED encounter and vital signs as below have been reviewed.   /75   Pulse 63   Temp 36.5 °C (97.7 °F) (Tympanic)   Resp 16   Ht 1.702 m (5' 7\")   Wt 73.5 kg (162 lb)   SpO2 96%   BMI 25.37 kg/m²   Oxygen Saturation Interpretation: Normal      ---------------------------------------------------PHYSICAL EXAM--------------------------------------  Physical Exam  Vitals and nursing note reviewed.   Constitutional:       General: She is not in acute distress.     Appearance: She is well-developed. She is not ill-appearing or toxic-appearing.   HENT:      Head: Normocephalic and atraumatic.   Eyes:      Extraocular Movements: Extraocular movements intact.      Conjunctiva/sclera: Conjunctivae normal.      Pupils: Pupils are equal, round, and reactive to light.   Neck:      Thyroid: No thyromegaly.      Vascular: No hepatojugular reflux or JVD.      Trachea: No tracheal deviation.   Cardiovascular:      Rate and Rhythm: Normal rate and regular rhythm.      Pulses:           Carotid pulses are 2+ on the right side and 2+ on the left side.       Radial pulses are 2+ on the right side and 2+ on the left side.        Dorsalis pedis pulses are 2+ on the right side and 2+ on the left side.        Posterior tibial pulses are 2+ on the right side and 2+ on the left side.      Heart sounds: Normal heart sounds. Heart sounds not distant. No murmur heard.     No systolic murmur is present.      No diastolic murmur is present.      No friction rub. No gallop.   Pulmonary:      Effort: Pulmonary effort is normal. No tachypnea, accessory muscle usage or respiratory distress.      Breath sounds: Normal breath sounds. No stridor. No decreased breath sounds, wheezing, rhonchi or rales.   Chest:      Chest wall: No mass, deformity, tenderness, crepitus or edema. There " is no dullness to percussion.   Abdominal:      Palpations: Abdomen is soft. There is no mass.      Tenderness: There is no abdominal tenderness. There is no guarding or rebound.   Musculoskeletal:         General: No swelling. Normal range of motion.      Cervical back: Normal range of motion and neck supple.      Right lower leg: No tenderness. No edema.      Left lower leg: No tenderness. No edema.   Lymphadenopathy:      Cervical: No cervical adenopathy.   Skin:     General: Skin is warm and dry.      Capillary Refill: Capillary refill takes less than 2 seconds.      Coloration: Skin is not cyanotic.      Findings: No rash.      Nails: There is no clubbing.   Neurological:      General: No focal deficit present.      Mental Status: She is alert.   Psychiatric:         Mood and Affect: Mood normal.            Procedures  NONE  ------------------------------ ED COURSE/MEDICAL DECISION MAKING----------------------    Medical Decision Making:   Patient seen and evaluated by me.  An IV is started appropriate labs been ordered.    Heart Score 4    ED Course as of 10/21/24 2035   Mon Oct 21, 2024   1913 An EKG at 1852 interpreted by me at 1858.  Sinus bradycardia rate 59 bpm.  Axis normal.   ms QRS 92 ms  ms.  There is nonspecific T wave abnormality.  No evidence of acute ST segment elevation or depression.  No STEMI [EC]   1913 Also noted is incomplete right bundle branch block with an RSR prime in lead V1 and V2 but the total QRS duration is 92 ms. [EC]   2028 CBC with Differential(!)  CBC is unremarkable [EC]   2028 Comprehensive Metabolic Panel(!)  Comprehensive metabolic panel is unremarkable [EC]   2028 Troponin I Series, High Sensitivity (0, 1 HR)  First troponin is normal at 3 [EC]   2029 Chest x-ray  IMPRESSION:  1.Top normal to mildly enlarged heart size with no signs of  active pulmonary parenchymal infiltration.  2.Lobulated contour of the right diaphragm most likely  developmental.  CT could be  considered for confirmation or comparison  with prior studies if available from elsewhere.  Signed by Monica Davidson DO   [EC]   2033 A second EKG at 2002 interpreted by me at 2010.  Sinus bradycardia 59 bpm.  Axis normal.   ms QRS 92 ms  ms nonspecific T wave abnormality.  No STEMI.  There is an incomplete right bundle branch block RSR prime in V1 and V2.  Overall this EKG is similar to previous. [EC]   2034 This patient is signed out to Dr. KEARA Elizondo on shift change at 2030. [EC]      ED Course User Index  [EC] Ramu Conroy DO      Counseling:   The emergency provider has spoken with the patient and discussed today’s results, in addition to providing specific details for the plan of care and counseling regarding the diagnosis and prognosis.  Questions are answered at this time and they are agreeable with the plan.      --------------------------------- IMPRESSION AND DISPOSITION ---------------------------------        IMPRESSION  No diagnosis found.    DISPOSITION  Disposition: signed out to Dr DINO Elizondo on shift change  Patient condition is stable      Billing Provider Critical Care Time: 0 minutes     Ramu Conroy DO  10/21/24 2031       Ramu Conroy,   10/21/24 2035       Ramu Conroy,   10/21/24 2035

## 2024-10-22 LAB
ATRIAL RATE: 59 BPM
ATRIAL RATE: 59 BPM
P AXIS: 46 DEGREES
P AXIS: 49 DEGREES
P OFFSET: 186 MS
P OFFSET: 190 MS
P ONSET: 130 MS
P ONSET: 130 MS
PR INTERVAL: 188 MS
PR INTERVAL: 188 MS
Q ONSET: 224 MS
Q ONSET: 224 MS
QRS COUNT: 10 BEATS
QRS COUNT: 10 BEATS
QRS DURATION: 92 MS
QRS DURATION: 92 MS
QT INTERVAL: 426 MS
QT INTERVAL: 430 MS
QTC CALCULATION(BAZETT): 421 MS
QTC CALCULATION(BAZETT): 425 MS
QTC FREDERICIA: 423 MS
QTC FREDERICIA: 427 MS
R AXIS: 87 DEGREES
R AXIS: 98 DEGREES
T AXIS: 64 DEGREES
T AXIS: 75 DEGREES
T OFFSET: 437 MS
T OFFSET: 439 MS
VENTRICULAR RATE: 59 BPM
VENTRICULAR RATE: 59 BPM

## 2024-10-22 NOTE — DISCHARGE INSTRUCTIONS
Return to the emergency department if you have any recurring symptoms or if you simply change your mind about the admission.    Take a baby aspirin daily.

## 2024-10-22 NOTE — PROGRESS NOTES
"Candi Mann is a 59 y.o. female on day 0 of admission presenting with 1 week history of intermittent chest pain at rest.  It has varied in quality, location and duration over the week initially being a severe pain lasting 2 to 3 minutes occurring every 20 minutes and now down to a pressure-like pain with only 2 or 3 episodes in the past 24 hours.  Patient was initially evaluated by Dr. Conroy,   Please see his documentation for full details of the H&P.  Was signed out to me at change of shift pending CTA results.    Subjective   Patient is denying any pain at this time.  She denies any recent fevers, chills, coughs, URI symptoms.       Objective     Physical Exam  Vitals reviewed.   Constitutional:       Appearance: She is well-developed.   HENT:      Head: Normocephalic and atraumatic.   Cardiovascular:      Rate and Rhythm: Normal rate and regular rhythm.      Heart sounds: Normal heart sounds.   Pulmonary:      Effort: Pulmonary effort is normal.      Breath sounds: Normal breath sounds.   Musculoskeletal:         General: Normal range of motion.      Cervical back: Normal range of motion and neck supple.      Right lower leg: No edema.      Left lower leg: No edema.   Skin:     General: Skin is warm and dry.   Neurological:      Mental Status: She is alert.         Last Recorded Vitals  Blood pressure 132/79, pulse 62, temperature 36.5 °C (97.7 °F), temperature source Tympanic, resp. rate 20, height 1.702 m (5' 7\"), weight 73.5 kg (162 lb), SpO2 96%.  Intake/Output last 3 Shifts:  No intake/output data recorded.    Relevant Results               Labs Reviewed   CBC WITH AUTO DIFFERENTIAL - Abnormal       Result Value    WBC 5.3      nRBC 0.0      RBC 4.40      Hemoglobin 13.3      Hematocrit 40.4      MCV 92      MCH 30.2      MCHC 32.9      RDW 12.3      Platelets 198      Neutrophils % 62.4      Immature Granulocytes %, Automated 1.9 (*)     Lymphocytes % 22.1      Monocytes % 10.6      Eosinophils % 1.7 "      Basophils % 1.3      Neutrophils Absolute 3.28      Immature Granulocytes Absolute, Automated 0.10      Lymphocytes Absolute 1.16 (*)     Monocytes Absolute 0.56      Eosinophils Absolute 0.09      Basophils Absolute 0.07     COMPREHENSIVE METABOLIC PANEL - Abnormal    Glucose 119 (*)     Sodium 140      Potassium 3.5      Chloride 104      Bicarbonate 30      Anion Gap 10      Urea Nitrogen 18      Creatinine 0.88      eGFR 76      Calcium 9.1      Albumin 4.1      Alkaline Phosphatase 95      Total Protein 7.2      AST 15      Bilirubin, Total 0.3      ALT 19     SERIAL TROPONIN-INITIAL - Normal    Troponin I, High Sensitivity 3      Narrative:     Less than 99th percentile of normal range cutoff-  Female and children under 18 years old <14 ng/L; Male <21 ng/L: Negative  Repeat testing should be performed if clinically indicated.     Female and children under 18 years old 14-50 ng/L; Male 21-50 ng/L:  Consistent with possible cardiac damage and possible increased clinical   risk. Serial measurements may help to assess extent of myocardial damage.     >50 ng/L: Consistent with cardiac damage, increased clinical risk and  myocardial infarction. Serial measurements may help assess extent of   myocardial damage.      NOTE: Children less than 1 year old may have higher baseline troponin   levels and results should be interpreted in conjunction with the overall   clinical context.     NOTE: Troponin I testing is performed using a different   testing methodology at [facility] than at other   [facility]. Direct result comparisons should only   be made within the same method.   SERIAL TROPONIN, 1 HOUR - Normal    Troponin I, High Sensitivity 3      Narrative:     Less than 99th percentile of normal range cutoff-  Female and children under 18 years old <14 ng/L; Male <21 ng/L: Negative  Repeat testing should be performed if clinically indicated.     Female and children under 18 years old 14-50 ng/L;  Male 21-50 ng/L:  Consistent with possible cardiac damage and possible increased clinical   risk. Serial measurements may help to assess extent of myocardial damage.     >50 ng/L: Consistent with cardiac damage, increased clinical risk and  myocardial infarction. Serial measurements may help assess extent of   myocardial damage.      NOTE: Children less than 1 year old may have higher baseline troponin   levels and results should be interpreted in conjunction with the overall   clinical context.     NOTE: Troponin I testing is performed using a different   testing methodology at [facility] than at other   [facility] hospitals. Direct result comparisons should only   be made within the same method.   TROPONIN SERIES- (INITIAL, 1 HR)    Narrative:     The following orders were created for panel order Troponin I Series, High Sensitivity (0, 1 HR).  Procedure                               Abnormality         Status                     ---------                               -----------         ------                     Troponin I, High Sensiti...[[id number]]  Normal              Final result               Troponin, High Sensitivi...[[id number]]  Normal              Final result                 Please view results for these tests on the individual orders.   GRAY TOP    Extra Tube Hold for add-ons.     B-TYPE NATRIURETIC PEPTIDE   D-DIMER, VTE EXCLUSION     ED Medication Administration from 10/21/2024 1848 to 10/21/2024 2201         Date/Time Order Dose Route Action Action by     10/21/2024 2055 EDT iohexol (OMNIPaque) 350 mg iodine/mL solution 50 mL 50 mL intravenous Given DINO Castro          CT angio chest for pulmonary embolism   Final Result   No evidence of pulmonary embolism or other acute intrathoracic   process.   Signed by Kee Hawkins MD      XR chest 1 view   Final Result   1.Top normal to mildly enlarged heart size with no signs of   active pulmonary parenchymal infiltration.   2.Lobulated contour  of the right diaphragm most likely   developmental.  CT could be considered for confirmation or comparison   with prior studies if available from elsewhere.   Signed by Monica Davidson DO        EKG  1852 --twelve-lead EKG was obtained and read by me. This demonstrates sinus bradycardia with a rate of 59, rightward axis, incomplete bundle branch block pattern, but no ectopy, no ischemia, no pericarditis. There was no  prior EKG.    2002 --twelve-lead EKG was obtained and read by me. This demonstrates sinus bradycardia with a rate of 59, incomplete right bundle branch block, but no ectopy, no ischemia, no pericarditis. There was no change compared to most recent prior EKG. arrival      2154 -results reviewed with patient.  Shared medical decision making discussion regarding advisability of admission for further testing.  Patient states that she cannot stay, because she has a dog at home and she lives at home and there is no one to care for it.  She states she is willing to follow-up with cardiology as an outpatient and can be available for stress testing, etc. as early as tomorrow.  She agrees to return if she has any recurrence of her symptoms.  She demonstrates capacity to understand the risk factors and has no criteria by which she may be kept against her will.  She retains the right to make this decision although is contrary to medical recommendations.           Assessment/Plan   Assessment & Plan  Chest pain at rest    Patient presents emergency department with the above history and physical.  No signs of sepsis, dehydration, hemodynamically significant arrhythmia or obvious ischemic changes on EKG.  EKG and troponin are negative x 2.  CT angio chest obtained and read by radiology demonstrate no evidence of pulmonary embolism, normal thoracic aortic diameter, bibasilar atelectasis.    Patient has heart score of 4; is at moderate risk for adverse cardiovascular event.  I did advise hospitalization for further  evaluation and treatment, but patient declines.  Shared medical decision making discussion including review of risk factors was had.  Patient does agree to outpatient follow-up.  She is advised to take a baby aspirin daily.  She will return for any recurrence of symptoms or if she changes her mind about hospitalization.       I spent 0 minutes in the critical care of this patient.      May Elizondo MD

## 2025-01-13 ENCOUNTER — APPOINTMENT (OUTPATIENT)
Dept: CARDIOLOGY | Facility: CLINIC | Age: 60
End: 2025-01-13
Payer: COMMERCIAL

## 2025-01-13 VITALS
WEIGHT: 166 LBS | DIASTOLIC BLOOD PRESSURE: 87 MMHG | HEIGHT: 67 IN | SYSTOLIC BLOOD PRESSURE: 148 MMHG | OXYGEN SATURATION: 100 % | HEART RATE: 57 BPM | BODY MASS INDEX: 26.06 KG/M2

## 2025-01-13 DIAGNOSIS — R06.02 SHORTNESS OF BREATH: Primary | ICD-10-CM

## 2025-01-13 DIAGNOSIS — R07.9 CHEST PAIN AT REST: ICD-10-CM

## 2025-01-13 DIAGNOSIS — R07.9 CHEST PAIN, UNSPECIFIED TYPE: ICD-10-CM

## 2025-01-13 PROCEDURE — 3008F BODY MASS INDEX DOCD: CPT | Performed by: INTERNAL MEDICINE

## 2025-01-13 PROCEDURE — 99204 OFFICE O/P NEW MOD 45 MIN: CPT | Performed by: INTERNAL MEDICINE

## 2025-01-13 RX ORDER — LEVOTHYROXINE SODIUM 88 UG/1
1 TABLET ORAL
COMMUNITY
Start: 2024-12-02

## 2025-01-13 RX ORDER — ERGOCALCIFEROL 1.25 MG/1
1.25 CAPSULE ORAL
COMMUNITY
Start: 2025-01-01

## 2025-01-13 NOTE — PROGRESS NOTES
Primary Care Physician: Ange Andersen MD      Date of Visit: 01/13/2025 11:20 AM EST  Location of visit:  [facility]   Type of Visit: New Patient        Chief Complaint   Patient presents with    New Patient Visit     Here to become an established patient and discuss having a stress test.       HPI / Summary:   Candi Mann is a 59 y.o. female who presents to establish cardiac care.   She had an ER visit for atypical chest pain in October 2024.  She is a former smoker.  Otherwise she remains reasonably active.  She has hyperlipidemia but refuses to take statins.      12 system review is negative except as noted above      Medical History:   Past Medical History:   Diagnosis Date    Disease of thyroid gland     Mitral valve disorder        Surgical History:   Past Surgical History:   Procedure Laterality Date    HYSTERECTOMY         Family History:   No family history on file.    Social History:   Tobacco Use: Medium Risk (10/21/2024)    Patient History     Smoking Tobacco Use: Former     Smokeless Tobacco Use: Never     Passive Exposure: Not on file             MEDICATIONS:   Current Outpatient Medications   Medication Instructions    ergocalciferol (VITAMIN D-2) 1.25 mg, Every 30 days    levothyroxine (Synthroid, Levoxyl) 88 mcg tablet 1 tablet, Daily (0630)         LABS:  CBC:   Lab Results   Component Value Date    WBC 5.3 10/21/2024    RBC 4.40 10/21/2024    HGB 13.3 10/21/2024    HCT 40.4 10/21/2024    MCV 92 10/21/2024    MCH 30.2 10/21/2024    MCHC 32.9 10/21/2024    RDW 12.3 10/21/2024     10/21/2024     CBC with Differential:    Lab Results   Component Value Date    WBC 5.3 10/21/2024    RBC 4.40 10/21/2024    HGB 13.3 10/21/2024    HCT 40.4 10/21/2024     10/21/2024    MCV 92 10/21/2024    MCH 30.2 10/21/2024    MCHC 32.9 10/21/2024    RDW 12.3 10/21/2024    NRBC 0.0 10/21/2024    LYMPHOPCT 22.1 10/21/2024    MONOPCT 10.6 10/21/2024    EOSPCT 1.7 10/21/2024    BASOPCT 1.3 10/21/2024  "   MONOSABS 0.56 10/21/2024    LYMPHSABS 1.16 (L) 10/21/2024    EOSABS 0.09 10/21/2024    BASOSABS 0.07 10/21/2024     CMP:    Lab Results   Component Value Date     10/21/2024    K 3.5 10/21/2024     10/21/2024    CO2 30 10/21/2024    BUN 18 10/21/2024    CREATININE 0.88 10/21/2024    GLUCOSE 119 (H) 10/21/2024    PROT 7.2 10/21/2024    CALCIUM 9.1 10/21/2024    BILITOT 0.3 10/21/2024    ALKPHOS 95 10/21/2024    AST 15 10/21/2024    ALT 19 10/21/2024     BMP:    Lab Results   Component Value Date     10/21/2024    K 3.5 10/21/2024     10/21/2024    CO2 30 10/21/2024    BUN 18 10/21/2024    CREATININE 0.88 10/21/2024    CALCIUM 9.1 10/21/2024    GLUCOSE 119 (H) 10/21/2024     Magnesium:No results found for: \"MG\"  Troponin:    Lab Results   Component Value Date    TROPHS 3 10/21/2024    TROPHS 3 10/21/2024         Visit Vitals  /87   Pulse 57   Ht 1.702 m (5' 7\")   Wt 75.3 kg (166 lb)   SpO2 100%   BMI 26.00 kg/m²   OB Status Hysterectomy   Smoking Status Former   BSA 1.89 m²          ECG dated 1/13/2025 independently reviewed   WNL      Physical Exam  On examination, she was comfortably sitting.  HEENT: normal, but for minor xanthelasma.  Neck: supple, Carotids: brisk upstroke, JVP: normal, CVS: Rate and rhythm regular, S1S2, Chest: CTAB, Abdomen: soft nontender, no organomegaly appreciated, Extremities: without any edema, peripheral pulses normal he felt.  CNS: HMF normal, no focal neurological deficit.    DIAGNOSES: Atypical chest pain.  Clinically normal CVS.  Hyperlipidemia.    I reassured her from a cardiac perspective.  I requested a treadmill stress test to rule out any significant CAD.    Thank you so much for the opportunity to participate in the care of this very pleasant lady. Please do not hesitate to contact me if I could be of any assistance in her future care.    Sincerely        Shar Cotto M.D.                01/13/25 at 5:08 PM - Anson Myles " MD Yadiel        Followup Appts:  Future Appointments   Date Time Provider Department Center   1/29/2025  8:00 AM CONN STRESS LAB [facility]   1/29/2025  8:15 AM CON CT 1 CONCT [facility]

## 2025-01-15 ENCOUNTER — HOSPITAL ENCOUNTER (OUTPATIENT)
Dept: CARDIOLOGY | Facility: HOSPITAL | Age: 60
Discharge: HOME | End: 2025-01-15
Payer: COMMERCIAL

## 2025-01-15 DIAGNOSIS — R07.9 CHEST PAIN AT REST: ICD-10-CM

## 2025-01-15 LAB
ATRIAL RATE: 58 BPM
P AXIS: 39 DEGREES
P OFFSET: 186 MS
P ONSET: 135 MS
PR INTERVAL: 184 MS
Q ONSET: 227 MS
QRS COUNT: 9 BEATS
QRS DURATION: 88 MS
QT INTERVAL: 454 MS
QTC CALCULATION(BAZETT): 445 MS
QTC FREDERICIA: 448 MS
R AXIS: 78 DEGREES
T AXIS: 71 DEGREES
T OFFSET: 454 MS
VENTRICULAR RATE: 58 BPM

## 2025-01-15 PROCEDURE — 93005 ELECTROCARDIOGRAM TRACING: CPT

## 2025-01-20 ENCOUNTER — LAB (OUTPATIENT)
Dept: LAB | Facility: LAB | Age: 60
End: 2025-01-20
Payer: COMMERCIAL

## 2025-01-20 DIAGNOSIS — E78.5 DYSLIPIDEMIA: ICD-10-CM

## 2025-01-20 DIAGNOSIS — R06.02 SHORTNESS OF BREATH: ICD-10-CM

## 2025-01-20 DIAGNOSIS — R07.9 CHEST PAIN AT REST: ICD-10-CM

## 2025-01-20 DIAGNOSIS — R07.9 CHEST PAIN, UNSPECIFIED TYPE: ICD-10-CM

## 2025-01-20 LAB
ALBUMIN SERPL BCP-MCNC: 4.4 G/DL (ref 3.4–5)
ALP SERPL-CCNC: 88 U/L (ref 33–110)
ALT SERPL W P-5'-P-CCNC: 17 U/L (ref 7–45)
ANION GAP SERPL CALC-SCNC: 8 MMOL/L (ref 10–20)
AST SERPL W P-5'-P-CCNC: 14 U/L (ref 9–39)
BASOPHILS # BLD AUTO: 0.04 X10*3/UL (ref 0–0.1)
BASOPHILS NFR BLD AUTO: 1 %
BILIRUB SERPL-MCNC: 0.6 MG/DL (ref 0–1.2)
BNP SERPL-MCNC: 125 PG/ML (ref 0–99)
BUN SERPL-MCNC: 18 MG/DL (ref 6–23)
CALCIUM SERPL-MCNC: 9.3 MG/DL (ref 8.6–10.3)
CHLORIDE SERPL-SCNC: 106 MMOL/L (ref 98–107)
CHOLEST SERPL-MCNC: 264 MG/DL (ref 0–199)
CHOLESTEROL/HDL RATIO: 6
CO2 SERPL-SCNC: 30 MMOL/L (ref 21–32)
CREAT SERPL-MCNC: 0.81 MG/DL (ref 0.5–1.05)
EGFRCR SERPLBLD CKD-EPI 2021: 84 ML/MIN/1.73M*2
EOSINOPHIL # BLD AUTO: 0.08 X10*3/UL (ref 0–0.7)
EOSINOPHIL NFR BLD AUTO: 2 %
ERYTHROCYTE [DISTWIDTH] IN BLOOD BY AUTOMATED COUNT: 12.2 % (ref 11.5–14.5)
GLUCOSE SERPL-MCNC: 102 MG/DL (ref 74–99)
HCT VFR BLD AUTO: 45.9 % (ref 36–46)
HDLC SERPL-MCNC: 44.3 MG/DL
HGB BLD-MCNC: 14.7 G/DL (ref 12–16)
IMM GRANULOCYTES # BLD AUTO: 0.05 X10*3/UL (ref 0–0.7)
IMM GRANULOCYTES NFR BLD AUTO: 1.3 % (ref 0–0.9)
INR PPP: 1 (ref 0.9–1.1)
LYMPHOCYTES # BLD AUTO: 0.86 X10*3/UL (ref 1.2–4.8)
LYMPHOCYTES NFR BLD AUTO: 21.9 %
MAGNESIUM SERPL-MCNC: 2.2 MG/DL (ref 1.6–2.4)
MCH RBC QN AUTO: 30.2 PG (ref 26–34)
MCHC RBC AUTO-ENTMCNC: 32 G/DL (ref 32–36)
MCV RBC AUTO: 94 FL (ref 80–100)
MONOCYTES # BLD AUTO: 0.46 X10*3/UL (ref 0.1–1)
MONOCYTES NFR BLD AUTO: 11.7 %
NEUTROPHILS # BLD AUTO: 2.43 X10*3/UL (ref 1.2–7.7)
NEUTROPHILS NFR BLD AUTO: 62.1 %
NON-HDL CHOLESTEROL: 220 MG/DL (ref 0–149)
NRBC BLD-RTO: 0 /100 WBCS (ref 0–0)
PLATELET # BLD AUTO: 199 X10*3/UL (ref 150–450)
POTASSIUM SERPL-SCNC: 4.1 MMOL/L (ref 3.5–5.3)
PROT SERPL-MCNC: 7 G/DL (ref 6.4–8.2)
PROTHROMBIN TIME: 10.7 SECONDS (ref 9.8–12.8)
RBC # BLD AUTO: 4.87 X10*6/UL (ref 4–5.2)
SODIUM SERPL-SCNC: 140 MMOL/L (ref 136–145)
TSH SERPL-ACNC: 1.68 MIU/L (ref 0.44–3.98)
URATE SERPL-MCNC: 4.1 MG/DL (ref 2.3–6.7)
WBC # BLD AUTO: 3.9 X10*3/UL (ref 4.4–11.3)

## 2025-01-20 PROCEDURE — 83880 ASSAY OF NATRIURETIC PEPTIDE: CPT

## 2025-01-20 PROCEDURE — 84550 ASSAY OF BLOOD/URIC ACID: CPT

## 2025-01-20 PROCEDURE — 85025 COMPLETE CBC W/AUTO DIFF WBC: CPT

## 2025-01-20 PROCEDURE — 83036 HEMOGLOBIN GLYCOSYLATED A1C: CPT

## 2025-01-20 PROCEDURE — 85610 PROTHROMBIN TIME: CPT

## 2025-01-20 PROCEDURE — 83718 ASSAY OF LIPOPROTEIN: CPT

## 2025-01-20 PROCEDURE — 84443 ASSAY THYROID STIM HORMONE: CPT

## 2025-01-20 PROCEDURE — 80053 COMPREHEN METABOLIC PANEL: CPT

## 2025-01-20 PROCEDURE — 82465 ASSAY BLD/SERUM CHOLESTEROL: CPT

## 2025-01-20 PROCEDURE — 80061 LIPID PANEL: CPT

## 2025-01-20 PROCEDURE — 83735 ASSAY OF MAGNESIUM: CPT

## 2025-01-21 DIAGNOSIS — E78.5 DYSLIPIDEMIA: ICD-10-CM

## 2025-01-21 LAB
CHOLEST SERPL-MCNC: 254 MG/DL (ref 0–199)
CHOLESTEROL/HDL RATIO: 6.1
EST. AVERAGE GLUCOSE BLD GHB EST-MCNC: 103 MG/DL
HBA1C MFR BLD: 5.2 %
HDLC SERPL-MCNC: 41.6 MG/DL
LDLC SERPL CALC-MCNC: 177 MG/DL
NON HDL CHOLESTEROL: 212 MG/DL (ref 0–149)
TRIGL SERPL-MCNC: 179 MG/DL (ref 0–149)
VLDL: 36 MG/DL (ref 0–40)

## 2025-01-29 ENCOUNTER — HOSPITAL ENCOUNTER (OUTPATIENT)
Dept: CARDIOLOGY | Facility: HOSPITAL | Age: 60
Discharge: HOME | End: 2025-01-29
Payer: COMMERCIAL

## 2025-01-29 ENCOUNTER — HOSPITAL ENCOUNTER (OUTPATIENT)
Dept: RADIOLOGY | Facility: HOSPITAL | Age: 60
Discharge: HOME | End: 2025-01-29
Payer: COMMERCIAL

## 2025-01-29 DIAGNOSIS — R07.9 CHEST PAIN AT REST: ICD-10-CM

## 2025-01-29 PROCEDURE — 93017 CV STRESS TEST TRACING ONLY: CPT

## 2025-01-29 PROCEDURE — 75571 CT HRT W/O DYE W/CA TEST: CPT

## 2025-01-29 PROCEDURE — 93016 CV STRESS TEST SUPVJ ONLY: CPT | Performed by: INTERNAL MEDICINE

## 2025-01-29 PROCEDURE — 93018 CV STRESS TEST I&R ONLY: CPT | Performed by: INTERNAL MEDICINE

## 2025-02-10 ENCOUNTER — APPOINTMENT (OUTPATIENT)
Dept: CARDIOLOGY | Facility: CLINIC | Age: 60
End: 2025-02-10
Payer: COMMERCIAL

## 2025-02-21 ENCOUNTER — HOSPITAL ENCOUNTER (OUTPATIENT)
Dept: RADIOLOGY | Facility: HOSPITAL | Age: 60
Discharge: HOME | End: 2025-02-21
Payer: COMMERCIAL

## 2025-02-21 DIAGNOSIS — K76.9 LIVER DISEASE, UNSPECIFIED: ICD-10-CM

## 2025-02-21 PROCEDURE — 76700 US EXAM ABDOM COMPLETE: CPT

## 2025-02-27 ENCOUNTER — HOSPITAL ENCOUNTER (OUTPATIENT)
Dept: RADIOLOGY | Facility: EXTERNAL LOCATION | Age: 60
Discharge: HOME | End: 2025-02-27

## 2025-02-27 ENCOUNTER — HOSPITAL ENCOUNTER (OUTPATIENT)
Dept: RADIOLOGY | Facility: HOSPITAL | Age: 60
Discharge: HOME | End: 2025-02-27
Payer: COMMERCIAL

## 2025-02-27 DIAGNOSIS — Z12.31 SCREENING MAMMOGRAM FOR BREAST CANCER: ICD-10-CM

## 2025-02-27 DIAGNOSIS — Z12.39 SCREENING FOR BREAST CANCER: ICD-10-CM

## 2025-02-27 PROCEDURE — 77063 BREAST TOMOSYNTHESIS BI: CPT

## 2025-04-24 ENCOUNTER — APPOINTMENT (OUTPATIENT)
Dept: GASTROENTEROLOGY | Facility: CLINIC | Age: 60
End: 2025-04-24
Payer: COMMERCIAL

## 2025-04-30 ENCOUNTER — PRE-ADMISSION TESTING (OUTPATIENT)
Dept: PREADMISSION TESTING | Facility: HOSPITAL | Age: 60
End: 2025-04-30
Payer: COMMERCIAL

## 2025-04-30 ENCOUNTER — ANESTHESIA EVENT (OUTPATIENT)
Dept: GASTROENTEROLOGY | Facility: HOSPITAL | Age: 60
End: 2025-04-30
Payer: COMMERCIAL

## 2025-04-30 PROBLEM — E78.5 HYPERLIPIDEMIA: Status: ACTIVE | Noted: 2025-04-30

## 2025-04-30 PROBLEM — E03.9 HYPOTHYROIDISM: Status: ACTIVE | Noted: 2025-04-30

## 2025-04-30 ASSESSMENT — DUKE ACTIVITY SCORE INDEX (DASI)
TOTAL_SCORE: 50.7
DASI METS SCORE: 9
CAN YOU PARTICIPATE IN STRENOUS SPORTS LIKE SWIMMING, SINGLES TENNIS, FOOTBALL, BASKETBALL, OR SKIING: NO
CAN YOU DO LIGHT WORK AROUND THE HOUSE LIKE DUSTING OR WASHING DISHES: YES
CAN YOU DO MODERATE WORK AROUND THE HOUSE LIKE VACUUMING, SWEEPING FLOORS OR CARRYING GROCERIES: YES
CAN YOU WALK INDOORS, SUCH AS AROUND YOUR HOUSE: YES
CAN YOU HAVE SEXUAL RELATIONS: YES
CAN YOU CLIMB A FLIGHT OF STAIRS OR WALK UP A HILL: YES
CAN YOU PARTICIPATE IN MODERATE RECREATIONAL ACTIVITIES LIKE GOLF, BOWLING, DANCING, DOUBLES TENNIS OR THROWING A BASEBALL OR FOOTBALL: YES
CAN YOU RUN A SHORT DISTANCE: YES
CAN YOU DO HEAVY WORK AROUND THE HOUSE LIKE SCRUBBING FLOORS OR LIFTING AND MOVING HEAVY FURNITURE: YES
CAN YOU TAKE CARE OF YOURSELF (EAT, DRESS, BATHE, OR USE TOILET): YES
CAN YOU WALK A BLOCK OR TWO ON LEVEL GROUND: YES
CAN YOU DO YARD WORK LIKE RAKING LEAVES, WEEDING OR PUSHING A MOWER: YES

## 2025-04-30 NOTE — PREPROCEDURE INSTRUCTIONS
Medication List            Accurate as of April 30, 2025  2:15 PM. Always use your most recent med list.                ergocalciferol 1250 mcg (50,000 units) capsule  Commonly known as: Vitamin D-2  Additional Medication Adjustments for Surgery: Take last dose 7 days before surgery     levothyroxine 88 mcg tablet  Commonly known as: Synthroid, Levoxyl  Medication Adjustments for Surgery: Take on the morning of surgery                 Follow all pre procedure instructions.  No solid foods on 05/14/2025.  Clear liquids only.  See enclosed instructions for colon prep and medication instructions.  You may continue to have clear liquids up to 4 hours prior to your arrival for your procedure.      [facility] will notify you the day prior to your procedure for your scheduled arrival time.  Please ensure that you have a responsible adult for transportation on the day of your procedure, as you will not be eligible for your procedure without appropriate transportation. Please follow all pre-operative instructions and call [facility] at [phone] should any questions arise.  Please also notify the Outpatient Surgery Department if there are any changes in your health between now and your scheduled procedure.  Thank you and we look forward to caring for you.      Colonoscopy Preparation Instructions     Prior to your procedure, purchase the following from your local grocery store or pharmacy.  A prescription is not needed.  Miralax (Glycolax) 8.3 oz. aka Polyethylene Glycol Powder  (238 gram bottle)  2.   Bisacodyl (Dulcolax laxative) 5 mg  Tablets, NOT suppositories  3.    Quantity 2 - 32 ounce bottles of Gatorade, Powerade or any non-carbonated clear liquid.  (Non-carbonated for miralax mixture only.   Pt may have carbonated drinks before or after drinking miralax prep solution).                                                                                                                                             ON THE DAY BEFORE YOUR PROCEDURE:     05/14/2025      Prepare colonoscopy prep mixture and refrigerate.   Take entire bottle of Miralax and mix with 64 ounces of Gatorade, Powerade or non-carbonated clear liq     The surgery scheduling department will call you with your arrival time.  Please disregard any automated calls or messages generated by the automated system on My Chart.     CLEAR LIQUID DIET THROUGHOUT THE DAY.    a. No products that contain milk or non-dairy creamer.    b. Nothing red or purple in color.  2.    At 5:00 pm begin first half of prep  a. Drink half (32 oz) of miralax solution at a rate of 8 ounces (1 cup) every 15 minutes.  b. Take two (2) Dulcolax tablets  3.    Continue clear liquids until bedtime     MORNING OF YOUR PROCEDURE:    05/15/2025        Five (5) hours prior to arrival time  a. Drink second half (32 oz) of miralax solution at a rate of 8 ounces (1 cup) every 15 minutes.  b. Take two (2) Dulcolax tablets  2.      You may continue to drink clear liquids up to four (4) hours prior to arrival time.    a. If liquids, bubble gum, hard candy, mints or tobacco products are consumed within four    (4) hours of procedure, the procedure will be canceled or postponed.     CLEAR LIQUID DIET CONSISTS OF:   Gatorade or Powerade  Broth or bouillon - chicken or beef (no meat or vegetable fibers)   Coffee or tea (no milk or non-dairy creamer)  Soda like ginger ale, 7-up or sprite  Apple juice, white grape juice with no pulp  Vinay-Aid or crystal light  Jell-O, Popsicles or Italian Ice (no red or purple)

## 2025-04-30 NOTE — ANESTHESIA PREPROCEDURE EVALUATION
Patient: Candi Mann    Procedure Information       Date/Time: 05/15/25 1200    Scheduled providers: Sage Yen MD    Procedure: COLONOSCOPY    Location: [facility]            Relevant Problems   Anesthesia (within normal limits)      Cardiac   (+) Hyperlipidemia      Pulmonary (within normal limits)      Neuro (within normal limits)      GI (within normal limits)      /Renal (within normal limits)      Liver (within normal limits)      Endocrine   (+) Hypothyroidism      Hematology (within normal limits)      Musculoskeletal (within normal limits)      HEENT (within normal limits)      ID (within normal limits)      Skin (within normal limits)      GYN (within normal limits)     There were no vitals filed for this visit.    Surgical History[1]  Medical History[2]  Current Medications[3]  Prior to Admission medications    Medication Sig Start Date End Date Taking? Authorizing Provider   ergocalciferol (Vitamin D-2) 1.25 MG (52733 UT) capsule Take 1 capsule (1,250 mcg) by mouth every 30 (thirty) days. 1/1/25   Historical Provider, MD   levothyroxine (Synthroid, Levoxyl) 88 mcg tablet Take 1 tablet (88 mcg) by mouth early in the morning.. 12/2/24   Historical Provider, MD     RX Allergies[4]  Social History     Tobacco Use    Smoking status: Former     Types: Cigarettes    Smokeless tobacco: Never   Substance Use Topics    Alcohol use: Yes     Alcohol/week: 6.0 standard drinks of alcohol     Types: 2 Glasses of wine, 4 Cans of beer per week         Chemistry    Lab Results   Component Value Date/Time     01/20/2025 0851    K 4.1 01/20/2025 0851     01/20/2025 0851    CO2 30 01/20/2025 0851    BUN 18 01/20/2025 0851    CREATININE 0.81 01/20/2025 0851    Lab Results   Component Value Date/Time    CALCIUM 9.3 01/20/2025 0851    ALKPHOS 88 01/20/2025 0851    AST 14 01/20/2025 0851    ALT 17 01/20/2025 0851    BILITOT 0.6 01/20/2025 0851          Lab Results   Component Value Date/Time     WBC 3.9 (L) 01/20/2025 0851    HGB 14.7 01/20/2025 0851    HCT 45.9 01/20/2025 0851     01/20/2025 0851     Lab Results   Component Value Date/Time    PROTIME 10.7 01/20/2025 0851    INR 1.0 01/20/2025 0851     Encounter Date: 01/15/25   ECG 12 Lead   Result Value    Ventricular Rate 58    Atrial Rate 58    CA Interval 184    QRS Duration 88    QT Interval 454    QTC Calculation(Bazett) 445    P Axis 39    R Axis 78    T Axis 71    QRS Count 9    Q Onset 227    P Onset 135    P Offset 186    T Offset 454    QTC Fredericia 448    Narrative    Sinus bradycardia  Otherwise normal ECG  When compared with ECG of 21-OCT-2024 20:02,  No significant change was found  Confirmed by Anson Cotto ([id number]) on 3/5/2025 12:27:01 PM     No results found for this or any previous visit from the past 1095 days.    Clinical information reviewed:                 Chart reviewed.  No clearance ordered.  Recent negative cardiac workup for atypical CP.    Stress test 1/29/25-  Summary:   1. Good effort tolerance 10 METs.   2. Normal HR and BP response.   3. Baseline normal ECG with no change on stress.   4. Negative stress test.   5. Adequate level of stress achieved.    NPO Detail:  No data recorded     Physical Exam    Airway  Mallampati: II     Cardiovascular - normal exam   Dental    Pulmonary - normal exam   Abdominal - normal exam           Anesthesia Plan    History of general anesthesia?: yes  History of complications of general anesthesia?: no    ASA 2     MAC     The patient is not a current smoker.  Patient did not smoke on day of procedure.    intravenous induction   Anesthetic plan and risks discussed with patient.           [1]   Past Surgical History:  Procedure Laterality Date    HYSTERECTOMY     [2]   Past Medical History:  Diagnosis Date    Disease of thyroid gland     Mitral valve disorder    [3]   Current Outpatient Medications:     ergocalciferol (Vitamin D-2) 1.25 MG (90640 UT) capsule, Take 1 capsule  (1,250 mcg) by mouth every 30 (thirty) days., Disp: , Rfl:     levothyroxine (Synthroid, Levoxyl) 88 mcg tablet, Take 1 tablet (88 mcg) by mouth early in the morning.., Disp: , Rfl:   [4]   Allergies  Allergen Reactions    Lavender Oil Anaphylaxis     EVEN THE SCENT OF LAVENDER WILL PUT PT INTO ANAPHYLAXIS    Penicillins Unknown

## 2025-05-15 ENCOUNTER — ANESTHESIA (OUTPATIENT)
Dept: GASTROENTEROLOGY | Facility: HOSPITAL | Age: 60
End: 2025-05-15
Payer: COMMERCIAL

## 2025-05-15 ENCOUNTER — HOSPITAL ENCOUNTER (OUTPATIENT)
Dept: GASTROENTEROLOGY | Facility: HOSPITAL | Age: 60
Discharge: HOME | End: 2025-05-15
Payer: COMMERCIAL

## 2025-05-15 VITALS
BODY MASS INDEX: 26.06 KG/M2 | HEIGHT: 67 IN | SYSTOLIC BLOOD PRESSURE: 120 MMHG | RESPIRATION RATE: 16 BRPM | WEIGHT: 166 LBS | HEART RATE: 62 BPM | TEMPERATURE: 98.3 F | DIASTOLIC BLOOD PRESSURE: 72 MMHG | OXYGEN SATURATION: 100 %

## 2025-05-15 DIAGNOSIS — Z12.11 ENCOUNTER FOR SCREENING COLONOSCOPY FOR NON-HIGH-RISK PATIENT: ICD-10-CM

## 2025-05-15 PROCEDURE — 45378 DIAGNOSTIC COLONOSCOPY: CPT | Performed by: SURGERY

## 2025-05-15 PROCEDURE — 3700000001 HC GENERAL ANESTHESIA TIME - INITIAL BASE CHARGE

## 2025-05-15 PROCEDURE — 3700000002 HC GENERAL ANESTHESIA TIME - EACH INCREMENTAL 1 MINUTE

## 2025-05-15 PROCEDURE — 7100000010 HC PHASE TWO TIME - EACH INCREMENTAL 1 MINUTE

## 2025-05-15 PROCEDURE — 7100000009 HC PHASE TWO TIME - INITIAL BASE CHARGE

## 2025-05-15 PROCEDURE — 2500000004 HC RX 250 GENERAL PHARMACY W/ HCPCS (ALT 636 FOR OP/ED): Mod: JZ | Performed by: NURSE ANESTHETIST, CERTIFIED REGISTERED

## 2025-05-15 RX ORDER — PROPOFOL 10 MG/ML
INJECTION, EMULSION INTRAVENOUS AS NEEDED
Status: DISCONTINUED | OUTPATIENT
Start: 2025-05-15 | End: 2025-05-15

## 2025-05-15 RX ORDER — LIDOCAINE HYDROCHLORIDE 20 MG/ML
INJECTION, SOLUTION EPIDURAL; INFILTRATION; INTRACAUDAL; PERINEURAL AS NEEDED
Status: DISCONTINUED | OUTPATIENT
Start: 2025-05-15 | End: 2025-05-15

## 2025-05-15 RX ADMIN — PROPOFOL 50 MG: 10 INJECTION, EMULSION INTRAVENOUS at 09:23

## 2025-05-15 RX ADMIN — PROPOFOL 50 MG: 10 INJECTION, EMULSION INTRAVENOUS at 09:28

## 2025-05-15 RX ADMIN — LIDOCAINE HYDROCHLORIDE 100 MG: 20 INJECTION, SOLUTION EPIDURAL; INFILTRATION; INTRACAUDAL; PERINEURAL at 09:23

## 2025-05-15 RX ADMIN — PROPOFOL 50 MG: 10 INJECTION, EMULSION INTRAVENOUS at 09:35

## 2025-05-15 RX ADMIN — PROPOFOL 50 MG: 10 INJECTION, EMULSION INTRAVENOUS at 09:31

## 2025-05-15 RX ADMIN — PROPOFOL 30 MG: 10 INJECTION, EMULSION INTRAVENOUS at 09:38

## 2025-05-15 RX ADMIN — PROPOFOL 50 MG: 10 INJECTION, EMULSION INTRAVENOUS at 09:25

## 2025-05-15 RX ADMIN — PROPOFOL 20 MG: 10 INJECTION, EMULSION INTRAVENOUS at 09:42

## 2025-05-15 SDOH — HEALTH STABILITY: MENTAL HEALTH: CURRENT SMOKER: 0

## 2025-05-15 ASSESSMENT — COLUMBIA-SUICIDE SEVERITY RATING SCALE - C-SSRS
2. HAVE YOU ACTUALLY HAD ANY THOUGHTS OF KILLING YOURSELF?: NO
1. IN THE PAST MONTH, HAVE YOU WISHED YOU WERE DEAD OR WISHED YOU COULD GO TO SLEEP AND NOT WAKE UP?: NO
6. HAVE YOU EVER DONE ANYTHING, STARTED TO DO ANYTHING, OR PREPARED TO DO ANYTHING TO END YOUR LIFE?: NO

## 2025-05-15 ASSESSMENT — PAIN SCALES - GENERAL
PAINLEVEL_OUTOF10: 0 - NO PAIN
PAIN_LEVEL: 0
PAINLEVEL_OUTOF10: 0 - NO PAIN

## 2025-05-15 ASSESSMENT — ENCOUNTER SYMPTOMS
ABDOMINAL DISTENTION: 0
CHILLS: 0
SHORTNESS OF BREATH: 0
FATIGUE: 0
NEUROLOGICAL NEGATIVE: 1
MUSCULOSKELETAL NEGATIVE: 1
WHEEZING: 0
NAUSEA: 0
WOUND: 0
DIARRHEA: 0
DIAPHORESIS: 0
CARDIOVASCULAR NEGATIVE: 1
CONSTIPATION: 0
PALPITATIONS: 0
BLOOD IN STOOL: 0
ABDOMINAL PAIN: 0
FEVER: 0
VOMITING: 0
PSYCHIATRIC NEGATIVE: 1

## 2025-05-15 ASSESSMENT — PAIN - FUNCTIONAL ASSESSMENT
PAIN_FUNCTIONAL_ASSESSMENT: 0-10

## 2025-05-15 NOTE — ANESTHESIA POSTPROCEDURE EVALUATION
Patient: Candi Mann    Procedure Summary       Date: 05/15/25 Room / Location: [facility]    Anesthesia Start: 0919 Anesthesia Stop: 0950    Procedure: COLONOSCOPY Diagnosis: Encounter for screening colonoscopy for non-high-risk patient    Scheduled Providers: Sage Yen MD Responsible Provider: MARCIA Kennedy    Anesthesia Type: MAC ASA Status: 2            Anesthesia Type: MAC    Vitals Value Taken Time   BP 94/59 05/15/25 09:48   Temp 36.4 °C (97.6 °F) 05/15/25 09:48   Pulse 58 05/15/25 09:48   Resp 16 05/15/25 09:48   SpO2 98 % 05/15/25 09:48       Anesthesia Post Evaluation    Patient location during evaluation: PACU  Patient participation: complete - patient participated  Level of consciousness: awake and alert  Pain score: 0  Pain management: adequate  Airway patency: patent  Two or more strategies used to mitigate risk of obstructive sleep apnea  Cardiovascular status: acceptable and stable  Respiratory status: acceptable and room air  Hydration status: acceptable  Postoperative Nausea and Vomiting: none        There were no known notable events for this encounter.

## 2025-05-15 NOTE — DISCHARGE INSTRUCTIONS
Patient Instructions after a Colonoscopy      The anesthetics, sedatives or narcotics which were given to you today will be acting in your body for the next 24 hours, so you might feel a little sleepy or groggy.  This feeling should slowly wear off. Carefully read and follow the instructions.     You received sedation today:  - Do not drive or operate any machinery or power tools of any kind.   - No alcoholic beverages today, not even beer or wine.  - Do not make any important decisions or sign any legal documents.  - No over the counter medications that contain alcohol or that may cause drowsiness.  - Do not make any important decisions or sign any legal documents.  - Make sure you have someone with you for first 24 hours.    While it is common to experience mild to moderate abdominal distention, gas, or belching after your procedure, if any of these symptoms occur following discharge from the GI Lab or within one week of having your procedure, call the [facility] to be advised whether a visit to your nearest Urgent Care or Emergency Department is indicated.  Take this paper with you if you go.     - If you develop an allergic reaction to the medications that were given during your procedure such as difficulty breathing, rash, hives, severe nausea, vomiting or lightheadedness.  - If you experience chest pain, shortness of breath, severe abdominal pain, fevers and chills.  -If you develop signs and symptoms of bleeding such as blood in your spit, if your stools turn black, tarry, or bloody  - If you have not urinated within 8 hours following your procedure.  - If your IV site becomes painful, red, inflamed, or looks infected.    If you received a biopsy/polypectomy/sphincterotomy the following instructions apply below:    __ Do not use Aspirin containing products, non-steroidal medications or anti-coagulants for one week following your procedure. (Examples of these types of medications are: Advil,  Arthrotec, Aleve, Coumadin, Ecotrin, Heparin, Ibuprofen, Indocin, Motrin, Naprosyn, Nuprin, Plavix, Vioxx, and Voltarin, or their generic forms.  This list is not all-inclusive.  Check with your physician or pharmacist before resuming medications.)   __ Eat a soft diet today.  Avoid foods that are poorly digested for the next 24 hours.  These foods would include: nuts, beans, lettuce, red meats, and fried foods. Start with liquids and advance your diet as tolerated, gradually work up to eating solids.   __ Do not have a Barium Study or Enema for one week.    Your physician recommends the additional following instructions:    -You have a contact number available for emergencies. The signs and symptoms of potential delayed complications were discussed with you. You may return to normal activities tomorrow.  -Resume your previous diet.  -Continue your present medications.   -We are waiting for your pathology results.  -Your physician has recommended a repeat colonoscopy (date to be determined after pending pathology results are reviewed) for surveillance based on pathology results.  -The findings and recommendations have been discussed with you.  -The findings and recommendations were discussed with your family.  - Please see Medication Reconciliation Form for new medication/medications prescribed.       If you experience any problems or have any questions following discharge from the GI Lab, please call:        Nurse Signature                                                                        Date___________________                                                                            Patient/Responsible Party Signature                                        Date___________________

## 2025-05-15 NOTE — H&P
History Of Present Illness  Candi Mann is a 60 y.o. female presenting with encounter for screening colonoscopy for non-high-risk patient. Here for colonoscopy. Last colonoscopy benign polyps removed. Reports no blood in stool. Regular bowel movements. Not having daily BM. Surgical hx of hysterectomy. Positive family history of colon cancer in mother. Tolerated prep well. Denies fever, chills, SoB, CP, n/v/d.     Past Medical History  Medical History[1]    Surgical History  Surgical History[2]     Social History  She reports that she has quit smoking. Her smoking use included cigarettes. She has never used smokeless tobacco. She reports current alcohol use of about 6.0 standard drinks of alcohol per week. She reports that she does not use drugs.    Family History  Family History[3]     Allergies  Lavender oil and Penicillins    Medications Ordered Prior to Encounter[4]     Review of Systems   Constitutional:  Negative for chills, diaphoresis, fatigue and fever.   HENT: Negative.     Respiratory:  Negative for shortness of breath and wheezing.    Cardiovascular: Negative.  Negative for chest pain and palpitations.   Gastrointestinal:  Negative for abdominal distention, abdominal pain, blood in stool, constipation, diarrhea, nausea and vomiting.   Musculoskeletal: Negative.    Skin:  Negative for pallor, rash and wound.   Neurological: Negative.    Psychiatric/Behavioral: Negative.          Physical Exam  Constitutional:       General: She is not in acute distress.     Appearance: Normal appearance. She is not ill-appearing.   HENT:      Head: Normocephalic.   Eyes:      General: No scleral icterus.     Conjunctiva/sclera: Conjunctivae normal.      Pupils: Pupils are equal, round, and reactive to light.   Cardiovascular:      Rate and Rhythm: Normal rate and regular rhythm.      Pulses: Normal pulses.      Heart sounds: Normal heart sounds. No murmur heard.  Pulmonary:      Effort: Pulmonary effort is normal. No  respiratory distress.      Breath sounds: Normal breath sounds.   Abdominal:      General: Abdomen is flat. Bowel sounds are normal. There is no distension.      Palpations: Abdomen is soft.      Tenderness: There is no abdominal tenderness.   Musculoskeletal:      Cervical back: Normal range of motion and neck supple.   Skin:     General: Skin is warm and dry.   Neurological:      General: No focal deficit present.      Mental Status: She is alert and oriented to person, place, and time.   Psychiatric:         Mood and Affect: Mood normal.         Behavior: Behavior normal.          Last Recorded Vitals  Vitals:    05/15/25 0847   BP: 122/71   Pulse: 56   Resp: 16   Temp: 36 °C (96.8 °F)   SpO2: 97%      Assessment & Plan  Encounter for screening colonoscopy for non-high-risk patient      Screening colonoscopy.    I spent 20 minutes in the professional and overall care of this patient.      Deandre Samuels PA-C         [1]   Past Medical History:  Diagnosis Date    Disease of thyroid gland     Hyperlipidemia 1997    Mitral valve disorder    [2]   Past Surgical History:  Procedure Laterality Date    BREAST BIOPSY  20182020    Markers X 2    HYSTERECTOMY  1997    OOPHORECTOMY  1997   [3]   Family History  Problem Relation Name Age of Onset    Anemia Mother Kat Lam     Cancer Mother Kat Lam     Stroke Mother Kat Lam     Thyroid disease Mother Kat Lam     Heart attack Mother Kat Lam     Atrial fibrillation Mother Kat Lam     Heart murmur Mother Kat Lam     Heart failure Father Brandon Lam     Stroke Father Brandon Lam    [4]   Current Outpatient Medications on File Prior to Encounter   Medication Sig Dispense Refill    ergocalciferol (Vitamin D-2) 1.25 MG (94900 UT) capsule Take 1 capsule (1.25 mg) by mouth every 30 (thirty) days.      levothyroxine (Synthroid, Levoxyl) 88 mcg tablet Take 1 tablet (88 mcg) by mouth early in the morning..       No  current facility-administered medications on file prior to encounter.

## (undated) DEVICE — HANDLE CVR PATENTED RETENTION DISC STRL LIGHT SHLD

## (undated) DEVICE — SOLUTION IV IRRIG POUR BRL 0.9% SODIUM CHL 2F7124

## (undated) DEVICE — NEEDLE HYPO 18GA L1.5IN PNK POLYPR HUB S STL THN WALL FILL

## (undated) DEVICE — E-Z CLEAN, NON-STICK, PTFE COATED, ELECTROSURGICAL BLADE ELECTRODE, 2.5 INCH (6.35 CM): Brand: EZ CLEAN

## (undated) DEVICE — PEN: MARKING STD 100/CS: Brand: MEDICAL ACTION INDUSTRIES

## (undated) DEVICE — PADDING,UNDERCAST,COTTON, 4"X4YD STERILE: Brand: MEDLINE

## (undated) DEVICE — STERILE LATEX POWDER-FREE SURGICAL GLOVESWITH NITRILE COATING: Brand: PROTEXIS

## (undated) DEVICE — 12 ML SYRINGE,LUER-LOCK TIP: Brand: MONOJECT

## (undated) DEVICE — E-Z CLEAN, NON-STICK, PTFE COATED, ELECTROSURGICAL BLADE ELECTRODE, 4 INCH (10.2 CM): Brand: MEGADYNE

## (undated) DEVICE — STANDARD SURGICAL GOWN, L: Brand: CONVERTORS

## (undated) DEVICE — GAUZE,SPONGE,4"X4",16PLY,STRL,LF,10/TRAY: Brand: MEDLINE

## (undated) DEVICE — BANDAGE,SELF ADHRNT,COFLEX,4"X5YD,STRL: Brand: COLABEL

## (undated) DEVICE — NEEDLE HYPO 25GA L1.5IN BLU POLYPR HUB S STL REG BVL STR

## (undated) DEVICE — PACK,EENT,TURBAN DRAPE,PK II: Brand: MEDLINE

## (undated) DEVICE — APPLICATOR COTTONTIP 6IN NS 1000 CA

## (undated) DEVICE — TOWEL,OR,DSP,ST,BLUE,STD,6/PK,12PK/CS: Brand: MEDLINE

## (undated) DEVICE — PACK PROC ORTH LO EXT IX CUST

## (undated) DEVICE — CONTROL SYRINGE LUER-LOCK TIP: Brand: MONOJECT

## (undated) DEVICE — NEPTUNE E-SEP SMOKE EVACUATION PENCIL, COATED, 70MM BLADE, PUSH BUTTON SWITCH: Brand: NEPTUNE E-SEP

## (undated) DEVICE — ELECTRODE PT RET AD L9FT HI MOIST COND ADH HYDRGEL CORDED

## (undated) DEVICE — MEDI-VAC NON-CONDUCTIVE SUCTION TUBING: Brand: CARDINAL HEALTH

## (undated) DEVICE — BNDG,ELSTC,MATRIX,STRL,4"X5YD,LF,HOOK&LP: Brand: MEDLINE

## (undated) DEVICE — GAUZE,SPONGE,4"X4",12PLY,STERILE,LF,2'S: Brand: MEDLINE

## (undated) DEVICE — DRESSING PETRO W3XL3IN OIL EMUL N ADH GZ KNIT IMPREG CELOS

## (undated) DEVICE — 4-PORT MANIFOLD: Brand: NEPTUNE 2

## (undated) DEVICE — DRESSING GZ XRFRM 4X4(25/BX 6BX/CS)

## (undated) DEVICE — INTENDED FOR TISSUE SEPARATION, AND OTHER PROCEDURES THAT REQUIRE A SHARP SURGICAL BLADE TO PUNCTURE OR CUT.: Brand: BARD-PARKER ® STAINLESS STEEL BLADES